# Patient Record
Sex: MALE | Race: BLACK OR AFRICAN AMERICAN | NOT HISPANIC OR LATINO | Employment: OTHER | URBAN - METROPOLITAN AREA
[De-identification: names, ages, dates, MRNs, and addresses within clinical notes are randomized per-mention and may not be internally consistent; named-entity substitution may affect disease eponyms.]

---

## 2024-04-25 ENCOUNTER — CLINICAL SUPPORT (OUTPATIENT)
Dept: EMERGENCY MEDICINE | Facility: HOSPITAL | Age: 59
End: 2024-04-25
Payer: COMMERCIAL

## 2024-04-25 ENCOUNTER — APPOINTMENT (OUTPATIENT)
Dept: RADIOLOGY | Facility: HOSPITAL | Age: 59
End: 2024-04-25
Payer: COMMERCIAL

## 2024-04-25 ENCOUNTER — HOSPITAL ENCOUNTER (OUTPATIENT)
Facility: HOSPITAL | Age: 59
Setting detail: OBSERVATION
Discharge: HOME | End: 2024-04-28
Attending: EMERGENCY MEDICINE | Admitting: INTERNAL MEDICINE
Payer: COMMERCIAL

## 2024-04-25 ENCOUNTER — APPOINTMENT (OUTPATIENT)
Dept: CARDIOLOGY | Facility: HOSPITAL | Age: 59
End: 2024-04-25
Payer: COMMERCIAL

## 2024-04-25 DIAGNOSIS — N17.9 AKI (ACUTE KIDNEY INJURY) (CMS-HCC): ICD-10-CM

## 2024-04-25 DIAGNOSIS — G89.29 CHRONIC PAIN OF LEFT KNEE: ICD-10-CM

## 2024-04-25 DIAGNOSIS — R79.89 ELEVATED TROPONIN: ICD-10-CM

## 2024-04-25 DIAGNOSIS — R07.9 CHEST PAIN, UNSPECIFIED TYPE: Primary | ICD-10-CM

## 2024-04-25 DIAGNOSIS — K04.7 TOOTH ABSCESS: ICD-10-CM

## 2024-04-25 DIAGNOSIS — M25.562 CHRONIC PAIN OF LEFT KNEE: ICD-10-CM

## 2024-04-25 DIAGNOSIS — I10 HYPERTENSION, UNSPECIFIED TYPE: ICD-10-CM

## 2024-04-25 PROBLEM — D64.9 ANEMIA: Status: ACTIVE | Noted: 2024-04-25

## 2024-04-25 PROBLEM — C64.2 MALIGNANT NEOPLASM OF LEFT KIDNEY (MULTI): Status: ACTIVE | Noted: 2024-04-25

## 2024-04-25 PROBLEM — E78.5 HYPERLIPIDEMIA: Status: ACTIVE | Noted: 2024-04-25

## 2024-04-25 PROBLEM — I63.9 CEREBROVASCULAR ACCIDENT (CVA) (MULTI): Status: ACTIVE | Noted: 2024-04-25

## 2024-04-25 PROBLEM — N28.89 LEFT RENAL MASS: Status: ACTIVE | Noted: 2024-04-25

## 2024-04-25 PROBLEM — G80.9 CEREBRAL PALSY (MULTI): Status: ACTIVE | Noted: 2024-04-25

## 2024-04-25 LAB
ALBUMIN SERPL BCP-MCNC: 4.2 G/DL (ref 3.4–5)
ALP SERPL-CCNC: 46 U/L (ref 33–120)
ALT SERPL W P-5'-P-CCNC: 17 U/L (ref 10–52)
AMPHETAMINES UR QL SCN: ABNORMAL
ANION GAP SERPL CALC-SCNC: 15 MMOL/L (ref 10–20)
AORTIC VALVE MEAN GRADIENT: 4.2 MMHG
AORTIC VALVE PEAK VELOCITY: 1.49 M/S
AST SERPL W P-5'-P-CCNC: 25 U/L (ref 9–39)
AV PEAK GRADIENT: 8.9 MMHG
AVA (PEAK VEL): 2.22 CM2
AVA (VTI): 2.64 CM2
BARBITURATES UR QL SCN: ABNORMAL
BASOPHILS # BLD AUTO: 0.02 X10*3/UL (ref 0–0.1)
BASOPHILS NFR BLD AUTO: 0.4 %
BENZODIAZ UR QL SCN: ABNORMAL
BILIRUB SERPL-MCNC: 0.9 MG/DL (ref 0–1.2)
BNP SERPL-MCNC: 64 PG/ML (ref 0–99)
BUN SERPL-MCNC: 11 MG/DL (ref 6–23)
BZE UR QL SCN: ABNORMAL
CALCIUM SERPL-MCNC: 9.3 MG/DL (ref 8.6–10.6)
CANNABINOIDS UR QL SCN: ABNORMAL
CARDIAC TROPONIN I PNL SERPL HS: 204 NG/L (ref 0–53)
CARDIAC TROPONIN I PNL SERPL HS: 205 NG/L (ref 0–53)
CHLORIDE SERPL-SCNC: 103 MMOL/L (ref 98–107)
CO2 SERPL-SCNC: 25 MMOL/L (ref 21–32)
CREAT SERPL-MCNC: 0.92 MG/DL (ref 0.5–1.3)
EGFRCR SERPLBLD CKD-EPI 2021: >90 ML/MIN/1.73M*2
EJECTION FRACTION APICAL 4 CHAMBER: 58.3
EOSINOPHIL # BLD AUTO: 0.25 X10*3/UL (ref 0–0.7)
EOSINOPHIL NFR BLD AUTO: 5.6 %
ERYTHROCYTE [DISTWIDTH] IN BLOOD BY AUTOMATED COUNT: 14.3 % (ref 11.5–14.5)
ETHANOL SERPL-MCNC: <10 MG/DL
FENTANYL+NORFENTANYL UR QL SCN: ABNORMAL
FLUAV RNA RESP QL NAA+PROBE: NOT DETECTED
FLUBV RNA RESP QL NAA+PROBE: NOT DETECTED
GLUCOSE SERPL-MCNC: 90 MG/DL (ref 74–99)
HCT VFR BLD AUTO: 34.1 % (ref 41–52)
HGB BLD-MCNC: 11.5 G/DL (ref 13.5–17.5)
IMM GRANULOCYTES # BLD AUTO: 0.01 X10*3/UL (ref 0–0.7)
IMM GRANULOCYTES NFR BLD AUTO: 0.2 % (ref 0–0.9)
LEFT ATRIUM VOLUME AREA LENGTH INDEX BSA: 38 ML/M2
LEFT VENTRICLE INTERNAL DIMENSION DIASTOLE: 5.34 CM (ref 3.5–6)
LEFT VENTRICULAR OUTFLOW TRACT DIAMETER: 2.2 CM
LV EJECTION FRACTION BIPLANE: 42 %
LYMPHOCYTES # BLD AUTO: 1 X10*3/UL (ref 1.2–4.8)
LYMPHOCYTES NFR BLD AUTO: 22.4 %
MCH RBC QN AUTO: 23.3 PG (ref 26–34)
MCHC RBC AUTO-ENTMCNC: 33.7 G/DL (ref 32–36)
MCV RBC AUTO: 69 FL (ref 80–100)
METHADONE UR QL SCN: ABNORMAL
MITRAL VALVE E/A RATIO: 0.84
MITRAL VALVE E/E' RATIO: 13.7
MONOCYTES # BLD AUTO: 0.38 X10*3/UL (ref 0.1–1)
MONOCYTES NFR BLD AUTO: 8.5 %
NEUTROPHILS # BLD AUTO: 2.8 X10*3/UL (ref 1.2–7.7)
NEUTROPHILS NFR BLD AUTO: 62.9 %
NRBC BLD-RTO: 0 /100 WBCS (ref 0–0)
OPIATES UR QL SCN: ABNORMAL
OXYCODONE+OXYMORPHONE UR QL SCN: ABNORMAL
PCP UR QL SCN: ABNORMAL
PLATELET # BLD AUTO: 194 X10*3/UL (ref 150–450)
POTASSIUM SERPL-SCNC: 3.7 MMOL/L (ref 3.5–5.3)
PROT SERPL-MCNC: 7.4 G/DL (ref 6.4–8.2)
RBC # BLD AUTO: 4.93 X10*6/UL (ref 4.5–5.9)
RIGHT VENTRICLE FREE WALL PEAK S': 10 CM/S
SARS-COV-2 RNA RESP QL NAA+PROBE: NOT DETECTED
SODIUM SERPL-SCNC: 139 MMOL/L (ref 136–145)
TRICUSPID ANNULAR PLANE SYSTOLIC EXCURSION: 2 CM
WBC # BLD AUTO: 4.5 X10*3/UL (ref 4.4–11.3)

## 2024-04-25 PROCEDURE — 80053 COMPREHEN METABOLIC PANEL: CPT

## 2024-04-25 PROCEDURE — 83880 ASSAY OF NATRIURETIC PEPTIDE: CPT

## 2024-04-25 PROCEDURE — 36415 COLL VENOUS BLD VENIPUNCTURE: CPT

## 2024-04-25 PROCEDURE — 2500000004 HC RX 250 GENERAL PHARMACY W/ HCPCS (ALT 636 FOR OP/ED): Mod: SE | Performed by: STUDENT IN AN ORGANIZED HEALTH CARE EDUCATION/TRAINING PROGRAM

## 2024-04-25 PROCEDURE — 84484 ASSAY OF TROPONIN QUANT: CPT

## 2024-04-25 PROCEDURE — G0378 HOSPITAL OBSERVATION PER HR: HCPCS

## 2024-04-25 PROCEDURE — 99223 1ST HOSP IP/OBS HIGH 75: CPT | Performed by: NURSE PRACTITIONER

## 2024-04-25 PROCEDURE — 93306 TTE W/DOPPLER COMPLETE: CPT

## 2024-04-25 PROCEDURE — 82077 ASSAY SPEC XCP UR&BREATH IA: CPT | Performed by: NURSE PRACTITIONER

## 2024-04-25 PROCEDURE — 2500000001 HC RX 250 WO HCPCS SELF ADMINISTERED DRUGS (ALT 637 FOR MEDICARE OP)

## 2024-04-25 PROCEDURE — 71046 X-RAY EXAM CHEST 2 VIEWS: CPT

## 2024-04-25 PROCEDURE — 73564 X-RAY EXAM KNEE 4 OR MORE: CPT | Mod: LEFT SIDE | Performed by: RADIOLOGY

## 2024-04-25 PROCEDURE — 96374 THER/PROPH/DIAG INJ IV PUSH: CPT | Mod: 59

## 2024-04-25 PROCEDURE — 80307 DRUG TEST PRSMV CHEM ANLYZR: CPT | Performed by: NURSE PRACTITIONER

## 2024-04-25 PROCEDURE — 2500000005 HC RX 250 GENERAL PHARMACY W/O HCPCS: Mod: SE | Performed by: NURSE PRACTITIONER

## 2024-04-25 PROCEDURE — 99285 EMERGENCY DEPT VISIT HI MDM: CPT | Mod: 25

## 2024-04-25 PROCEDURE — 93306 TTE W/DOPPLER COMPLETE: CPT | Performed by: STUDENT IN AN ORGANIZED HEALTH CARE EDUCATION/TRAINING PROGRAM

## 2024-04-25 PROCEDURE — 96372 THER/PROPH/DIAG INJ SC/IM: CPT | Performed by: NURSE PRACTITIONER

## 2024-04-25 PROCEDURE — 2500000001 HC RX 250 WO HCPCS SELF ADMINISTERED DRUGS (ALT 637 FOR MEDICARE OP): Mod: SE | Performed by: NURSE PRACTITIONER

## 2024-04-25 PROCEDURE — 93308 TTE F-UP OR LMTD: CPT

## 2024-04-25 PROCEDURE — 2500000004 HC RX 250 GENERAL PHARMACY W/ HCPCS (ALT 636 FOR OP/ED): Mod: SE | Performed by: NURSE PRACTITIONER

## 2024-04-25 PROCEDURE — 2500000006 HC RX 250 W HCPCS SELF ADMINISTERED DRUGS (ALT 637 FOR ALL PAYERS): Mod: SE | Performed by: NURSE PRACTITIONER

## 2024-04-25 PROCEDURE — 93005 ELECTROCARDIOGRAM TRACING: CPT | Mod: 77

## 2024-04-25 PROCEDURE — 73560 X-RAY EXAM OF KNEE 1 OR 2: CPT | Mod: 59,LT

## 2024-04-25 PROCEDURE — 87636 SARSCOV2 & INF A&B AMP PRB: CPT

## 2024-04-25 PROCEDURE — 93005 ELECTROCARDIOGRAM TRACING: CPT

## 2024-04-25 PROCEDURE — 85025 COMPLETE CBC W/AUTO DIFF WBC: CPT

## 2024-04-25 PROCEDURE — 71046 X-RAY EXAM CHEST 2 VIEWS: CPT | Mod: FOREIGN READ | Performed by: RADIOLOGY

## 2024-04-25 PROCEDURE — 73564 X-RAY EXAM KNEE 4 OR MORE: CPT | Mod: LT

## 2024-04-25 RX ORDER — AMLODIPINE BESYLATE 5 MG/1
10 TABLET ORAL ONCE
Status: COMPLETED | OUTPATIENT
Start: 2024-04-25 | End: 2024-04-25

## 2024-04-25 RX ORDER — ERGOCALCIFEROL 1.25 MG/1
50000 CAPSULE ORAL
COMMUNITY
Start: 2023-10-23

## 2024-04-25 RX ORDER — FINASTERIDE 5 MG/1
5 TABLET, FILM COATED ORAL DAILY
Status: DISCONTINUED | OUTPATIENT
Start: 2024-04-25 | End: 2024-04-28 | Stop reason: HOSPADM

## 2024-04-25 RX ORDER — LANOLIN ALCOHOL/MO/W.PET/CERES
100 CREAM (GRAM) TOPICAL DAILY
Status: DISCONTINUED | OUTPATIENT
Start: 2024-04-28 | End: 2024-04-28 | Stop reason: HOSPADM

## 2024-04-25 RX ORDER — FOLIC ACID 1 MG/1
1 TABLET ORAL DAILY
Status: DISCONTINUED | OUTPATIENT
Start: 2024-04-25 | End: 2024-04-28 | Stop reason: HOSPADM

## 2024-04-25 RX ORDER — ASPIRIN 81 MG/1
81 TABLET ORAL DAILY
Status: DISCONTINUED | OUTPATIENT
Start: 2024-04-25 | End: 2024-04-28 | Stop reason: HOSPADM

## 2024-04-25 RX ORDER — AMLODIPINE BESYLATE 5 MG/1
5 TABLET ORAL DAILY
COMMUNITY
Start: 2023-10-23

## 2024-04-25 RX ORDER — LORAZEPAM 1 MG/1
1 TABLET ORAL EVERY 2 HOUR PRN
Status: DISCONTINUED | OUTPATIENT
Start: 2024-04-25 | End: 2024-04-28 | Stop reason: HOSPADM

## 2024-04-25 RX ORDER — TIMOLOL MALEATE 5 MG/ML
1 SOLUTION/ DROPS OPHTHALMIC DAILY
COMMUNITY
Start: 2024-04-17

## 2024-04-25 RX ORDER — LORAZEPAM 1 MG/1
0.5 TABLET ORAL EVERY 2 HOUR PRN
Status: DISCONTINUED | OUTPATIENT
Start: 2024-04-25 | End: 2024-04-28 | Stop reason: HOSPADM

## 2024-04-25 RX ORDER — METOPROLOL SUCCINATE 50 MG/1
50 TABLET, EXTENDED RELEASE ORAL DAILY
COMMUNITY

## 2024-04-25 RX ORDER — HYDROCHLOROTHIAZIDE 25 MG/1
12.5 TABLET ORAL ONCE
Status: COMPLETED | OUTPATIENT
Start: 2024-04-25 | End: 2024-04-25

## 2024-04-25 RX ORDER — CHLORTHALIDONE 25 MG/1
25 TABLET ORAL DAILY
Status: DISCONTINUED | OUTPATIENT
Start: 2024-04-25 | End: 2024-04-28 | Stop reason: HOSPADM

## 2024-04-25 RX ORDER — LANOLIN ALCOHOL/MO/W.PET/CERES
100 CREAM (GRAM) TOPICAL DAILY
COMMUNITY
Start: 2023-02-19

## 2024-04-25 RX ORDER — TAMSULOSIN HYDROCHLORIDE 0.4 MG/1
0.4 CAPSULE ORAL DAILY
Status: DISCONTINUED | OUTPATIENT
Start: 2024-04-25 | End: 2024-04-28 | Stop reason: HOSPADM

## 2024-04-25 RX ORDER — DOXEPIN HYDROCHLORIDE 50 MG/1
100 CAPSULE ORAL NIGHTLY
Status: DISCONTINUED | OUTPATIENT
Start: 2024-04-25 | End: 2024-04-28 | Stop reason: HOSPADM

## 2024-04-25 RX ORDER — FINASTERIDE 5 MG/1
5 TABLET, FILM COATED ORAL DAILY
COMMUNITY

## 2024-04-25 RX ORDER — TRAZODONE HYDROCHLORIDE 100 MG/1
100 TABLET ORAL NIGHTLY
COMMUNITY
Start: 2023-10-23

## 2024-04-25 RX ORDER — OXYBUTYNIN CHLORIDE 5 MG/1
5 TABLET ORAL 3 TIMES DAILY
Status: DISCONTINUED | OUTPATIENT
Start: 2024-04-25 | End: 2024-04-28 | Stop reason: HOSPADM

## 2024-04-25 RX ORDER — FLUOXETINE HYDROCHLORIDE 20 MG/1
20 CAPSULE ORAL DAILY
COMMUNITY
Start: 2023-02-19

## 2024-04-25 RX ORDER — FLUOXETINE HYDROCHLORIDE 20 MG/1
20 CAPSULE ORAL DAILY
Status: DISCONTINUED | OUTPATIENT
Start: 2024-04-25 | End: 2024-04-28 | Stop reason: HOSPADM

## 2024-04-25 RX ORDER — OMEPRAZOLE 20 MG/1
20 CAPSULE, DELAYED RELEASE ORAL
COMMUNITY
Start: 2023-10-23

## 2024-04-25 RX ORDER — LIDOCAINE 560 MG/1
1 PATCH PERCUTANEOUS; TOPICAL; TRANSDERMAL DAILY
Status: DISCONTINUED | OUTPATIENT
Start: 2024-04-25 | End: 2024-04-27

## 2024-04-25 RX ORDER — LISINOPRIL 5 MG/1
10 TABLET ORAL ONCE
Status: COMPLETED | OUTPATIENT
Start: 2024-04-25 | End: 2024-04-25

## 2024-04-25 RX ORDER — TRAZODONE HYDROCHLORIDE 50 MG/1
100 TABLET ORAL NIGHTLY
Status: DISCONTINUED | OUTPATIENT
Start: 2024-04-25 | End: 2024-04-28 | Stop reason: HOSPADM

## 2024-04-25 RX ORDER — ASPIRIN 81 MG/1
81 TABLET ORAL DAILY
COMMUNITY
End: 2024-04-28 | Stop reason: HOSPADM

## 2024-04-25 RX ORDER — FOLIC ACID 1 MG/1
1 TABLET ORAL DAILY
COMMUNITY
Start: 2022-08-09

## 2024-04-25 RX ORDER — LISINOPRIL 20 MG/1
20 TABLET ORAL DAILY
COMMUNITY
Start: 2023-10-23

## 2024-04-25 RX ORDER — OXYBUTYNIN CHLORIDE 15 MG/1
15 TABLET, EXTENDED RELEASE ORAL DAILY
COMMUNITY

## 2024-04-25 RX ORDER — AMOXICILLIN 250 MG
2 CAPSULE ORAL 2 TIMES DAILY
Status: DISCONTINUED | OUTPATIENT
Start: 2024-04-25 | End: 2024-04-28 | Stop reason: HOSPADM

## 2024-04-25 RX ORDER — BENZONATATE 100 MG/1
100 CAPSULE ORAL 3 TIMES DAILY PRN
Status: DISCONTINUED | OUTPATIENT
Start: 2024-04-25 | End: 2024-04-28 | Stop reason: HOSPADM

## 2024-04-25 RX ORDER — BACLOFEN 10 MG/1
10 TABLET ORAL 3 TIMES DAILY
Status: DISCONTINUED | OUTPATIENT
Start: 2024-04-25 | End: 2024-04-28 | Stop reason: HOSPADM

## 2024-04-25 RX ORDER — METOPROLOL TARTRATE 50 MG/1
50 TABLET ORAL ONCE
Status: COMPLETED | OUTPATIENT
Start: 2024-04-25 | End: 2024-04-25

## 2024-04-25 RX ORDER — AMLODIPINE BESYLATE 5 MG/1
5 TABLET ORAL DAILY
Status: DISCONTINUED | OUTPATIENT
Start: 2024-04-25 | End: 2024-04-28 | Stop reason: HOSPADM

## 2024-04-25 RX ORDER — TALC
3 POWDER (GRAM) TOPICAL NIGHTLY
Status: DISCONTINUED | OUTPATIENT
Start: 2024-04-25 | End: 2024-04-28 | Stop reason: HOSPADM

## 2024-04-25 RX ORDER — DICLOFENAC SODIUM 10 MG/G
4 GEL TOPICAL 4 TIMES DAILY
Status: DISCONTINUED | OUTPATIENT
Start: 2024-04-25 | End: 2024-04-28 | Stop reason: HOSPADM

## 2024-04-25 RX ORDER — ACETAMINOPHEN 325 MG/1
975 TABLET ORAL EVERY 6 HOURS
Status: DISCONTINUED | OUTPATIENT
Start: 2024-04-25 | End: 2024-04-27

## 2024-04-25 RX ORDER — PANTOPRAZOLE SODIUM 40 MG/1
40 TABLET, DELAYED RELEASE ORAL
Status: DISCONTINUED | OUTPATIENT
Start: 2024-04-26 | End: 2024-04-28 | Stop reason: HOSPADM

## 2024-04-25 RX ORDER — GABAPENTIN 600 MG/1
600 TABLET ORAL 3 TIMES DAILY
COMMUNITY
Start: 2023-10-22

## 2024-04-25 RX ORDER — DOXEPIN HYDROCHLORIDE 100 MG/1
100 CAPSULE ORAL NIGHTLY
COMMUNITY
Start: 2023-10-23

## 2024-04-25 RX ORDER — BACLOFEN 10 MG/1
10 TABLET ORAL 3 TIMES DAILY
COMMUNITY
Start: 2023-10-22

## 2024-04-25 RX ORDER — TAMSULOSIN HYDROCHLORIDE 0.4 MG/1
0.4 CAPSULE ORAL DAILY
COMMUNITY
Start: 2023-02-19

## 2024-04-25 RX ORDER — GABAPENTIN 300 MG/1
600 CAPSULE ORAL 3 TIMES DAILY
Status: DISCONTINUED | OUTPATIENT
Start: 2024-04-25 | End: 2024-04-28 | Stop reason: HOSPADM

## 2024-04-25 RX ORDER — CHLORTHALIDONE 25 MG/1
25 TABLET ORAL DAILY
COMMUNITY
Start: 2023-10-23

## 2024-04-25 RX ORDER — LISINOPRIL 10 MG/1
20 TABLET ORAL DAILY
Status: DISCONTINUED | OUTPATIENT
Start: 2024-04-25 | End: 2024-04-28 | Stop reason: HOSPADM

## 2024-04-25 RX ORDER — MULTIVIT-MIN/IRON FUM/FOLIC AC 7.5 MG-4
1 TABLET ORAL DAILY
Status: DISCONTINUED | OUTPATIENT
Start: 2024-04-25 | End: 2024-04-28 | Stop reason: HOSPADM

## 2024-04-25 RX ORDER — GUAIFENESIN 600 MG/1
600 TABLET, EXTENDED RELEASE ORAL 2 TIMES DAILY
Status: DISCONTINUED | OUTPATIENT
Start: 2024-04-25 | End: 2024-04-28 | Stop reason: HOSPADM

## 2024-04-25 RX ORDER — ASPIRIN 81 MG/1
81 TABLET ORAL DAILY
COMMUNITY
Start: 2022-08-09 | End: 2024-04-25 | Stop reason: ENTERED-IN-ERROR

## 2024-04-25 RX ORDER — METOPROLOL SUCCINATE 50 MG/1
50 TABLET, EXTENDED RELEASE ORAL DAILY
Status: DISCONTINUED | OUTPATIENT
Start: 2024-04-25 | End: 2024-04-28 | Stop reason: HOSPADM

## 2024-04-25 RX ORDER — THIAMINE HYDROCHLORIDE 100 MG/ML
100 INJECTION, SOLUTION INTRAMUSCULAR; INTRAVENOUS DAILY
Status: COMPLETED | OUTPATIENT
Start: 2024-04-25 | End: 2024-04-27

## 2024-04-25 RX ORDER — ENOXAPARIN SODIUM 100 MG/ML
40 INJECTION SUBCUTANEOUS EVERY 24 HOURS
Status: DISCONTINUED | OUTPATIENT
Start: 2024-04-25 | End: 2024-04-28 | Stop reason: HOSPADM

## 2024-04-25 RX ORDER — TALC
3 POWDER (GRAM) TOPICAL NIGHTLY
COMMUNITY
Start: 2023-10-22

## 2024-04-25 RX ORDER — TIMOLOL MALEATE 5 MG/ML
1 SOLUTION/ DROPS OPHTHALMIC DAILY
Status: DISCONTINUED | OUTPATIENT
Start: 2024-04-25 | End: 2024-04-28 | Stop reason: HOSPADM

## 2024-04-25 RX ADMIN — HYDROCHLOROTHIAZIDE 12.5 MG: 25 TABLET ORAL at 08:13

## 2024-04-25 RX ADMIN — AMLODIPINE BESYLATE 10 MG: 5 TABLET ORAL at 08:13

## 2024-04-25 RX ADMIN — DICLOFENAC 4 G: 10 GEL TOPICAL at 12:29

## 2024-04-25 RX ADMIN — BENZONATATE 100 MG: 100 CAPSULE ORAL at 12:28

## 2024-04-25 RX ADMIN — LISINOPRIL 10 MG: 5 TABLET ORAL at 08:13

## 2024-04-25 RX ADMIN — OXYBUTYNIN CHLORIDE 5 MG: 5 TABLET ORAL at 14:28

## 2024-04-25 RX ADMIN — GUAIFENESIN 600 MG: 600 TABLET ORAL at 12:28

## 2024-04-25 RX ADMIN — DOXEPIN HYDROCHLORIDE 100 MG: 50 CAPSULE ORAL at 20:35

## 2024-04-25 RX ADMIN — FLUOXETINE 20 MG: 20 CAPSULE ORAL at 12:28

## 2024-04-25 RX ADMIN — TIMOLOL MALEATE 1 DROP: 5 SOLUTION/ DROPS OPHTHALMIC at 12:29

## 2024-04-25 RX ADMIN — GABAPENTIN 600 MG: 300 CAPSULE ORAL at 20:34

## 2024-04-25 RX ADMIN — GABAPENTIN 600 MG: 300 CAPSULE ORAL at 14:28

## 2024-04-25 RX ADMIN — FINASTERIDE 5 MG: 5 TABLET, FILM COATED ORAL at 12:28

## 2024-04-25 RX ADMIN — TRAZODONE HYDROCHLORIDE 100 MG: 50 TABLET ORAL at 20:35

## 2024-04-25 RX ADMIN — METOPROLOL TARTRATE 50 MG: 50 TABLET, FILM COATED ORAL at 08:13

## 2024-04-25 RX ADMIN — PERFLUTREN 3 ML OF DILUTION: 6.52 INJECTION, SUSPENSION INTRAVENOUS at 14:05

## 2024-04-25 RX ADMIN — ACETAMINOPHEN 975 MG: 325 TABLET ORAL at 12:28

## 2024-04-25 RX ADMIN — TAMSULOSIN HYDROCHLORIDE 0.4 MG: 0.4 CAPSULE ORAL at 12:28

## 2024-04-25 RX ADMIN — ENOXAPARIN SODIUM 40 MG: 100 INJECTION SUBCUTANEOUS at 20:37

## 2024-04-25 RX ADMIN — BACLOFEN 10 MG: 10 TABLET ORAL at 14:28

## 2024-04-25 RX ADMIN — FOLIC ACID 1 MG: 1 TABLET ORAL at 12:29

## 2024-04-25 RX ADMIN — ASPIRIN 81 MG: 81 TABLET, COATED ORAL at 12:29

## 2024-04-25 RX ADMIN — SENNOSIDES AND DOCUSATE SODIUM 2 TABLET: 8.6; 5 TABLET ORAL at 12:29

## 2024-04-25 RX ADMIN — GUAIFENESIN 600 MG: 600 TABLET ORAL at 20:33

## 2024-04-25 RX ADMIN — Medication 1 TABLET: at 12:29

## 2024-04-25 RX ADMIN — BACLOFEN 10 MG: 10 TABLET ORAL at 20:33

## 2024-04-25 RX ADMIN — LIDOCAINE 1 PATCH: 4 PATCH TOPICAL at 12:29

## 2024-04-25 RX ADMIN — OXYBUTYNIN CHLORIDE 5 MG: 5 TABLET ORAL at 20:34

## 2024-04-25 RX ADMIN — THIAMINE HYDROCHLORIDE 100 MG: 100 INJECTION, SOLUTION INTRAMUSCULAR; INTRAVENOUS at 12:30

## 2024-04-25 RX ADMIN — MELATONIN 3 MG: 3 TAB ORAL at 20:34

## 2024-04-25 ASSESSMENT — LIFESTYLE VARIABLES
VISUAL DISTURBANCES: NOT PRESENT
NAUSEA AND VOMITING: NO NAUSEA AND NO VOMITING
TREMOR: NO TREMOR
BLOOD PRESSURE: 163/103
VISUAL DISTURBANCES: NOT PRESENT
PULSE: 69
ANXIETY: NO ANXIETY, AT EASE
TREMOR: NO TREMOR
ORIENTATION AND CLOUDING OF SENSORIUM: ORIENTED AND CAN DO SERIAL ADDITIONS
PAROXYSMAL SWEATS: NO SWEAT VISIBLE
ORIENTATION AND CLOUDING OF SENSORIUM: ORIENTED AND CAN DO SERIAL ADDITIONS
TOTAL SCORE: 0
PULSE: 71
BLOOD PRESSURE: 126/102
TREMOR: NO TREMOR
PULSE: 72
TOTAL SCORE: 0
PAROXYSMAL SWEATS: NO SWEAT VISIBLE
EVER FELT BAD OR GUILTY ABOUT YOUR DRINKING: NO
BLOOD PRESSURE: 119/80
PAROXYSMAL SWEATS: NO SWEAT VISIBLE
NAUSEA AND VOMITING: NO NAUSEA AND NO VOMITING
NAUSEA AND VOMITING: NO NAUSEA AND NO VOMITING
TOTAL SCORE: 0
AGITATION: NORMAL ACTIVITY
BLOOD PRESSURE: 145/103
TOTAL SCORE: 0
AGITATION: NORMAL ACTIVITY
AGITATION: NORMAL ACTIVITY
ANXIETY: NO ANXIETY, AT EASE
NAUSEA AND VOMITING: NO NAUSEA AND NO VOMITING
AUDITORY DISTURBANCES: NOT PRESENT
TREMOR: NO TREMOR
HEADACHE, FULLNESS IN HEAD: NOT PRESENT
ANXIETY: NO ANXIETY, AT EASE
HEADACHE, FULLNESS IN HEAD: NOT PRESENT
HEADACHE, FULLNESS IN HEAD: NOT PRESENT
PULSE: 66
TOTAL SCORE: 0
HEADACHE, FULLNESS IN HEAD: NOT PRESENT
AGITATION: NORMAL ACTIVITY
HAVE YOU EVER FELT YOU SHOULD CUT DOWN ON YOUR DRINKING: NO
VISUAL DISTURBANCES: NOT PRESENT
AGITATION: NORMAL ACTIVITY
AUDITORY DISTURBANCES: NOT PRESENT
AUDITORY DISTURBANCES: NOT PRESENT
NAUSEA AND VOMITING: NO NAUSEA AND NO VOMITING
AUDITORY DISTURBANCES: NOT PRESENT
TOTAL SCORE: 0
AUDITORY DISTURBANCES: NOT PRESENT
TREMOR: NO TREMOR
HEADACHE, FULLNESS IN HEAD: NOT PRESENT
ORIENTATION AND CLOUDING OF SENSORIUM: ORIENTED AND CAN DO SERIAL ADDITIONS
VISUAL DISTURBANCES: NOT PRESENT
ANXIETY: NO ANXIETY, AT EASE
ORIENTATION AND CLOUDING OF SENSORIUM: ORIENTED AND CAN DO SERIAL ADDITIONS
ORIENTATION AND CLOUDING OF SENSORIUM: ORIENTED AND CAN DO SERIAL ADDITIONS
ANXIETY: NO ANXIETY, AT EASE
VISUAL DISTURBANCES: NOT PRESENT
EVER HAD A DRINK FIRST THING IN THE MORNING TO STEADY YOUR NERVES TO GET RID OF A HANGOVER: NO
HAVE PEOPLE ANNOYED YOU BY CRITICIZING YOUR DRINKING: NO

## 2024-04-25 ASSESSMENT — ENCOUNTER SYMPTOMS
CONSTITUTIONAL NEGATIVE: 1
CHEST TIGHTNESS: 1
MUSCULOSKELETAL NEGATIVE: 1
COUGH: 1
WHEEZING: 0
GASTROINTESTINAL NEGATIVE: 1
NEUROLOGICAL NEGATIVE: 1
EYES NEGATIVE: 1

## 2024-04-25 ASSESSMENT — PAIN DESCRIPTION - DESCRIPTORS: DESCRIPTORS: STABBING

## 2024-04-25 ASSESSMENT — PAIN - FUNCTIONAL ASSESSMENT
PAIN_FUNCTIONAL_ASSESSMENT: 0-10
PAIN_FUNCTIONAL_ASSESSMENT: 0-10

## 2024-04-25 ASSESSMENT — PAIN SCALES - GENERAL
PAINLEVEL_OUTOF10: 8
PAINLEVEL_OUTOF10: 8
PAINLEVEL_OUTOF10: 2
PAINLEVEL_OUTOF10: 7

## 2024-04-25 ASSESSMENT — COLUMBIA-SUICIDE SEVERITY RATING SCALE - C-SSRS
1. IN THE PAST MONTH, HAVE YOU WISHED YOU WERE DEAD OR WISHED YOU COULD GO TO SLEEP AND NOT WAKE UP?: NO
6. HAVE YOU EVER DONE ANYTHING, STARTED TO DO ANYTHING, OR PREPARED TO DO ANYTHING TO END YOUR LIFE?: NO
2. HAVE YOU ACTUALLY HAD ANY THOUGHTS OF KILLING YOURSELF?: NO

## 2024-04-25 NOTE — ED PROVIDER NOTES
HPI   No chief complaint on file.      Patient is a 58-year-old male with past medical history of HTN & RCC (S/P Partial Nephrectomy), HFrEF (40-45% EF 06/22), BPH, MS, Treated HCV, Hepatic Steatosis, Cerebellar Stroke (w/ residual unsteadiness), Substance Use Disorder presenting with concern for chest pain, malaise, cough, left knee pain.  Endorses several days of subjective fevers, cough with chest pain that started at rest today.  Endorses woken from sleep with chest pain that is left-sided, sharp without radiation or associated shortness of breath, nausea, vomiting, diaphoresis.  Does endorse it is worse when he coughs or takes a deep breath.  Does not endorse history of blood clots or anticoagulation use.  Does endorse he is taking all his blood pressure medications but did not take his a.m. doses today.  Otherwise endorses concern for left-sided knee pain that endorses feels similar to his prior MS flares.  Endorses no current medications for the MS.  Does report recurrent recent falls and ambulating at baseline with walker.                        No data recorded                   Patient History   Past Medical History:   Diagnosis Date    Abnormal findings on diagnostic imaging of other specified body structures     Abnormal finding on imaging    Alcoholic liver disease, unspecified (Multi)     Liver disease due to alcohol    Liver disease, unspecified     Chronic liver disease    Personal history of other diseases of the circulatory system     History of hypertension    Unspecified asthma, uncomplicated (HHS-HCC)     Childhood asthma, unspecified asthma severity, uncomplicated     Past Surgical History:   Procedure Laterality Date    CT ABDOMEN PELVIS ANGIOGRAM W AND/OR WO IV CONTRAST  1/26/2018    CT ABDOMEN PELVIS ANGIOGRAM W AND/OR WO IV CONTRAST 1/26/2018 [facility] INPATIENT LEGACY     No family history on file.  Social History     Tobacco Use    Smoking status: Not on file    Smokeless tobacco: Not on file    Substance Use Topics    Alcohol use: Not on file    Drug use: Not on file       Physical Exam   ED Triage Vitals   Temp Pulse Resp BP   -- -- -- --      SpO2 Temp src Heart Rate Source Patient Position   -- -- -- --      BP Location FiO2 (%)     -- --       Physical Exam  Vitals and nursing note reviewed.   Constitutional:       General: He is not in acute distress.     Appearance: He is well-developed.   HENT:      Head: Normocephalic and atraumatic.   Eyes:      Conjunctiva/sclera: Conjunctivae normal.   Cardiovascular:      Rate and Rhythm: Normal rate and regular rhythm.      Heart sounds: No murmur heard.     Comments: 2+ radials and PTs.  Pulmonary:      Effort: Pulmonary effort is normal. No respiratory distress.      Breath sounds: Normal breath sounds.   Abdominal:      Palpations: Abdomen is soft.      Tenderness: There is no abdominal tenderness.   Musculoskeletal:         General: No swelling.      Cervical back: Neck supple.      Comments: Passive management range of motion of knee intact.  No significant swelling or tenderness palpation.    No significant lower extremity swelling.  Calf symmetric bilaterally.  No tautness or tenderness palpation.   Skin:     General: Skin is warm and dry.      Capillary Refill: Capillary refill takes less than 2 seconds.   Neurological:      General: No focal deficit present.      Mental Status: He is alert and oriented to person, place, and time.   Psychiatric:         Mood and Affect: Mood normal.         ED Course & MDM   ED Course as of 04/25/24 1036   Thu Apr 25, 2024   0735 EKG NSR HR 74 qtc 444 mild PIPO in v2 and v3, no STD, TWI v5 and v6 c/w prior from May 2023 [AM]      ED Course User Index  [AM] Zachary Mary MD         Diagnoses as of 04/25/24 1036   Chest pain, unspecified type   Hypertension, unspecified type   Chronic pain of left knee   Elevated troponin       Medical Decision Making  EKG shows a normal rate of 74bpm, normal sinus rhythm, normal  axis,  ms, QRS 94 ms, QTc 444 ms.  Early repolarization in V2, V3, otherwise normal ST and T wave pattern with no evidence of acute ischemia or other acute findings.    Patient is a 58-year-old male with past medical history of HTN & RCC (S/P Partial Nephrectomy), HFrEF (40-45% EF 06/22), BPH, MS, Treated HCV, Hepatic Steatosis, Cerebellar Stroke (w/ residual unsteadiness), Substance Use Disorder presenting with concern for chest pain, malaise, cough, left knee pain.  EKG nonischemic, troponins trended from 205->204 which is above baseline but not markedly elevated.  Differential of chest pain includes demand due to hypertension, costochondritis due to viral syndrome with need to rule out ACS.  Patient treated with for hypertension with home blood pressure medications of amlodipine, HCTZ, lisinopril, metoprolol.  Point-of-care ultrasound otherwise with reduced EF but no other marked abnormal pathology.  Chest x-ray with no acute pathology.  Patient otherwise complaining of recurrent falls and left knee pain but trauma exam otherwise generally benign and x-ray of left knee with no acute fracture.  Given patient has elevated troponins, chest pain and has not had stress testing in 5 years, case was discussed with medicine and patient admitted to medicine for further management.    Patient seen and discussed with Dr. Tyra Norman MD, PhD  Emergency Medicine PGY2          Procedure    Performed by: Zhang Norman MD  Authorized by: Zachary Mary MD  Cardiac Indications: chest pain                Procedure: Cardiac Ultrasound    Findings:   Views: parasternal long, parasternal short, apical four and subxiphoid  The pericardial space was visualized and was NEGATIVE for a significant pericardial effusion.  Activity: Ventricular contractions were visualized.  LV: LV systolic function was DECREASED. and Comparable to slightly reduced compared with prior documented EF of 40%  RV: RV size was  NORMAL.    Impression:  Cardiac: The focused cardiac ultrasound exam had ABNORMAL findings as specified.           Zhang Norman MD  Resident  04/25/24 9516

## 2024-04-25 NOTE — H&P
HPI     Mr Self is a 58y male with PMHx: HTN & RCC (S/P Partial Nephrectomy), HFrEF (40-45% EF 06/22), BPH, MS, Treated HCV, Hepatin Steatosis, Cerebellar Stroke (w/ residual unsteadiness), Substance Use Disorder who presented to the ED today with c/o Left sided chest pain that radiates to left shoulder, he describes as a stabbing pain.  States it is worse when he coughs.  States his cough is dry hacking with occasional moist productive with white sputum. States  symptoms for about 3 days.  States he has not taken any of his BP meds today he felt too sick.      While in the ED he was found to have an elevated /118 likely 2/2 not taking morning meds- responded well to being given home medications.  His Trops were 205->204 respectively. Utox was positive for Cocaine, marijuana and Fentanyl.  Patient to be admitted observation for CP and HTN emergency.    ROS/EXAM     Review of Systems   Constitutional: Negative.    HENT: Negative.     Eyes: Negative.    Respiratory:  Positive for cough and chest tightness. Negative for wheezing.    Cardiovascular:  Positive for chest pain.   Gastrointestinal: Negative.    Genitourinary: Negative.    Musculoskeletal: Negative.    Skin: Negative.    Neurological: Negative.    All other systems reviewed and are negative.    Physical Exam  Vitals reviewed.   HENT:      Head: Normocephalic and atraumatic.      Nose: Nose normal.      Mouth/Throat:      Mouth: Mucous membranes are moist.      Pharynx: Oropharynx is clear.   Eyes:      Conjunctiva/sclera: Conjunctivae normal.   Cardiovascular:      Rate and Rhythm: Normal rate and regular rhythm.      Pulses: Normal pulses.      Heart sounds: Normal heart sounds.   Pulmonary:      Effort: Pulmonary effort is normal.      Breath sounds: Normal breath sounds.   Abdominal:      General: Abdomen is flat. Bowel sounds are normal.      Palpations: Abdomen is soft.   Musculoskeletal:         General: Normal range of motion.      Cervical  back: Normal range of motion and neck supple.   Skin:     General: Skin is warm and dry.      Capillary Refill: Capillary refill takes less than 2 seconds.   Neurological:      Mental Status: He is alert and oriented to person, place, and time.   Psychiatric:         Mood and Affect: Mood normal.         Thought Content: Thought content normal.         Judgment: Judgment normal.         Vitals/LABS/RESULTS     Last Recorded Vitals  Blood pressure (!) 163/103, pulse 71, temperature 36.8 °C (98.2 °F), temperature source Temporal, resp. rate 17, SpO2 98%.  Intake/Output last 3 Shifts:  No intake/output data recorded.    Relevant Results  Lab Results   Component Value Date    WBC 4.5 04/25/2024    HGB 11.5 (L) 04/25/2024    HCT 34.1 (L) 04/25/2024    MCV 69 (L) 04/25/2024     04/25/2024      Lab Results   Component Value Date    GLUCOSE 90 04/25/2024    CALCIUM 9.3 04/25/2024     04/25/2024    K 3.7 04/25/2024    CO2 25 04/25/2024     04/25/2024    BUN 11 04/25/2024    CREATININE 0.92 04/25/2024     XR knee left 1-2 views    Result Date: 4/25/2024  Interpreted By:  Bright Green, STUDY: Left knee dated  4/25/2024.   INDICATION: Signs/Symptoms:sunrise view requested by radiology   COMPARISON: Correlation is made with same date radiographs.   ACCESSION NUMBER(S): [id number]   ORDERING CLINICIAN: LISA VAUGHN   TECHNIQUE: Three views of the left knee.   FINDINGS: No fracture or dislocation is evident.  There is a prominent lateral marginal patellar osteophyte and there is patellar enthesophyte formation.  No soft tissue gas or radiopaque foreign body is evident.       No osseous injury is evident. The finding seen on the radiographs from earlier on the same date likely represent superimposition of the patellar enthesophyte and/or marginal patellar osteophyte.   MACRO: None   Signed by: Bright Green 4/25/2024 10:32 AM Dictation workstation:   [id number]    XR knee left 4+ views    Result Date:  4/25/2024  Interpreted By:  Bright Green, STUDY: Left knee dated  4/25/2024.   INDICATION: Signs/Symptoms:knee pain, recurrent falls   COMPARISON: None.   ACCESSION NUMBER(S): [id number]   ORDERING CLINICIAN: MILENA NORMAN   TECHNIQUE: Four views of the left knee.   FINDINGS: There is a questionable lucency along the lateral margin of the patella. No dislocation is evident. No knee effusion is evident. There is mild patellofemoral and femorotibial degenerative change. There is patellar enthesophyte formation. There is an irregular focus of increased density in the posterior distal femoral metaphysis. No overlying endosteal scalloping or periosteal reaction is evident. This likely represents an indolent chondroid or fibro-osseous lesion of bone.       1. Possible lucency along the lateral margin of the patella. Lateral marginal fracture is a differential consideration. A sunrise radiographs of the knee is recommended for further initial assessment. 2. Mild degenerative change of the knee. 3. Structure in the distal femoral metaphysis with differential considerations as above. Follow-up radiographs in 6 months time are recommended to reassess.   MACRO: None   Signed by: Bright Green 4/25/2024 9:33 AM Dictation workstation:   [id number]    XR chest 2 views    Result Date: 4/25/2024  STUDY: Chest Radiographs;  4/25/2024 7:17AM INDICATION: Chest pain. COMPARISON: 2/18/2023 XR Chest. ACCESSION NUMBER(S): [id number] ORDERING CLINICIAN: MILENA NORMAN TECHNIQUE:  Frontal and lateral chest. FINDINGS: CARDIOMEDIASTINAL SILHOUETTE: Cardiomediastinal silhouette is normal in size and configuration.  LUNGS: Lungs are clear.  ABDOMEN: No remarkable upper abdominal findings.  BONES: No acute osseous changes.    No acute process. Signed by Jose Raul Mcmullen MD    Point of Care Ultrasound    Result Date: 4/25/2024  Milena Norman MD     4/25/2024 11:04 AM Performed by: Milena Norman MD Authorized by: Zachary Mary MD  Cardiac  Indications: chest pain Procedure: Cardiac Ultrasound Findings:  Views: parasternal long, parasternal short, apical four and subxiphoid The pericardial space was visualized and was NEGATIVE for a significant pericardial effusion. Activity: Ventricular contractions were visualized. LV: LV systolic function was DECREASED. and Comparable to slightly reduced compared with prior documented EF of 40% RV: RV size was NORMAL. Impression: Cardiac: The focused cardiac ultrasound exam had ABNORMAL findings as specified.       Medications     Scheduled medications  acetaminophen, 975 mg, oral, q6h  amLODIPine, 5 mg, oral, Daily  aspirin, 81 mg, oral, Daily  baclofen, 10 mg, oral, TID  chlorthalidone, 25 mg, oral, Daily  diclofenac sodium, 4 g, Topical, 4x daily  doxepin, 100 mg, oral, Nightly  enoxaparin, 40 mg, subcutaneous, q24h  finasteride, 5 mg, oral, Daily  FLUoxetine, 20 mg, oral, Daily  folic acid, 1 mg, oral, Daily  gabapentin, 600 mg, oral, TID  guaiFENesin, 600 mg, oral, BID  lidocaine, 1 patch, transdermal, Daily  lisinopril, 20 mg, oral, Daily  melatonin, 3 mg, oral, Nightly  metoprolol succinate XL, 50 mg, oral, Daily  multivitamin with minerals, 1 tablet, oral, Daily  oxybutynin, 5 mg, oral, TID  [START ON 4/26/2024] pantoprazole, 40 mg, oral, Daily before breakfast  perflutren lipid microspheres, 0.5-10 mL of dilution, intravenous, Once  sennosides-docusate sodium, 2 tablet, oral, BID  tamsulosin, 0.4 mg, oral, Daily  [START ON 4/28/2024] thiamine, 100 mg, oral, Daily  thiamine, 100 mg, intravenous, Daily  timolol, 1 drop, Both Eyes, Daily  traZODone, 100 mg, oral, Nightly      Continuous medications     PRN medications  PRN medications: benzonatate, LORazepam **OR** LORazepam **OR** LORazepam    PLAN     Mr Self is a 58y male with PMHx: HTN & RCC (S/P Partial Nephrectomy), HFrEF (40-45% EF 06/22), BPH, MS, Treated HCV, Hepatin Steatosis, Cerebellar Stroke (w/ residual unsteadiness), Substance Use Disorder who  presented to the ED today with c/o Left sided chest pain that radiates to left shoulder, he describes as a stabbing pain.  States it is worse when he coughs.  States his cough is dry hacking with occasional moist productive with white sputum. States  symptoms for about 3 days.  States he has not taken any of his BP meds today he felt too sick.      While in the ED he was found to have an elevated /118 likely 2/2 not taking morning meds- responded well to being given home medications.  His Trops were 205->204 respectively. Utox was positive for Cocaine, marijuana and Fentanyl.  Patient to be admitted observation for CP and HTN emergency.  Assessment/Plan:    Chest Pain  HTN emergency  HFrEF  -BP on arrival 192/118->163/103  -Trop in ->204 baseline 127-184  -EKG in ED NSR and baseline for patient  -Telemetry  -daily weights, strict I/O  -Echo   -c/w home amlodipine 5mg daily  -c/w home aspirin 81mg daily  -c/w home chlorthalidone 25mg daily  -c/w lisinopril 20mg daily  -c/w metop 50mg daily    BPH  -c/w home finasteride 20mg daily  -c/w home tamsulosin 0.4mg daily    MS  -c/w home gabapentin 600mg tid  -c/w home oxybutynin 5mg daily  -c/w home doxepin 100mg daily  -c/w home baclofen 10mg tid    ETOH abuse  Multi drug abuse  Depression/anxiety  insomnia  -CIWA  -folic/MVI/thiamine  -c/w home melatonin 3mg daily  -c/w home trazodine 100mg at bedtime  -c/w home fluoxetine 20mg daily    Fluids: monitor and replete as needed  Electrolytes: monitor and replete as needed  Nutrition: Regular diet   GI prophylaxis: Protonix 40mg QD  DVT prophylaxis: SCD/lovenox  Code Status: Full Code  PCP  Pharmacy    Disposition:  Admitted for above diagnoses. Plan per above. Anticipate hospitalization >2 midnights.       Kym Bolaños, APRN-CNP    I spent 75 minutes in the professional and overall care of this patient.

## 2024-04-25 NOTE — ED TRIAGE NOTES
BIB Ems for CP that started yesterday. Patient describes pain as stabbing on Right side of chest that radiates into neck. C/O pain 8/10. Able to ambulate to bathroom unassisted with slow and steady gait to provide urine sample.

## 2024-04-25 NOTE — HOSPITAL COURSE
Mr Self is a 58y male with PMHx: HTN & RCC (S/P Partial Nephrectomy), HFrEF (40-45% EF 06/22), BPH, MS, Treated HCV, Hepatin Steatosis, Cerebellar Stroke (w/ residual unsteadiness), Substance Use Disorder who presented to the ED today with c/o Left sided chest pain that radiates to left shoulder, he describes as a stabbing pain.  States it is worse when he coughs.  States his cough is dry hacking with occasional moist productive with white sputum. States  symptoms for about 3 days.  States he has not taken any of his BP meds today he felt too sick.      While in the ED he was found to have an elevated /118 likely 2/2 not taking morning meds- responded well to being given home medications.  His Trops were 205->204 respectively. Utox was positive for Cocaine, marijuana and Fentanyl.  Patient to be admitted observation for CP and HTN emergency.

## 2024-04-25 NOTE — PROGRESS NOTES
Pharmacy Medication History Review    Christiano eSlf is a 58 y.o. male admitted for Chest pain. Pharmacy reviewed the patient's rzysn-oa-yabfuftep medications and allergies for accuracy.    Medications ADDED:  N/A  Medications CHANGED:  N/A  Medications REMOVED:   Voltaren gel         The list below reflects the updated PTA list. Comments regarding how patient may be taking medications differently can be found in the Admit Orders Activity  Prior to Admission Medications   Prescriptions Last Dose Informant Patient Reported? Taking?   FLUoxetine (PROzac) 20 mg capsule 4/24/2024 Self Yes Yes   Sig: Take 1 capsule (20 mg) by mouth once daily.   amLODIPine (Norvasc) 5 mg tablet 4/24/2024 Self Yes Yes   Sig: Take 1 tablet (5 mg) by mouth once daily.   baclofen (Lioresal) 10 mg tablet 4/24/2024 Self Yes Yes   Sig: Take 1 tablet (10 mg) by mouth 3 times a day.   chlorthalidone (Hygroton) 25 mg tablet 4/24/2024 Self Yes Yes   Sig: Take 1 tablet (25 mg) by mouth once daily.   doxepin (SINEquan) 100 mg capsule 4/24/2024 Self Yes Yes   Sig: Take 1 capsule (100 mg) by mouth once daily at bedtime.   ergocalciferol (Vitamin D-2) 1.25 MG (69223 UT) capsule Unknown Self Yes Yes   Sig: Take 1 capsule (50,000 Units) by mouth 1 (one) time per week.   finasteride (Proscar) 5 mg tablet Unknown Self Yes No   Sig: Take 1 tablet (5 mg) by mouth once daily.   folic acid (Folvite) 1 mg tablet Unknown Self Yes Yes   Sig: Take 1 tablet (1 mg) by mouth once daily.   gabapentin (Neurontin) 600 mg tablet 4/24/2024 Self Yes Yes   Sig: Take 1 tablet (600 mg) by mouth 3 times a day.   lisinopril 20 mg tablet 4/24/2024 Self Yes Yes   Sig: Take 1 tablet (20 mg) by mouth once daily.   melatonin 3 mg tablet 4/24/2024 Self Yes Yes   Sig: Take 1 tablet (3 mg) by mouth once daily at bedtime.   metoprolol succinate XL (Toprol-XL) 50 mg 24 hr tablet 4/24/2024 Self Yes Yes   Sig: Take 1 tablet (50 mg) by mouth once daily.   omeprazole (PriLOSEC) 20 mg DR  capsule 4/24/2024 Self Yes Yes   Sig: Take 1 capsule (20 mg) by mouth once daily in the morning. Take before meals.   oxybutynin XL (Ditropan-XL) 15 mg 24 hr tablet 4/24/2024 Self Yes Yes   Sig: Take 1 tablet (15 mg) by mouth once daily.   tamsulosin (Flomax) 0.4 mg 24 hr capsule 4/24/2024 Self Yes Yes   Sig: Take 1 capsule (0.4 mg) by mouth once daily.   thiamine 100 mg tablet Unknown Self Yes Yes   Sig: Take 1 tablet (100 mg) by mouth once daily.   timolol (Timoptic) 0.5 % ophthalmic solution 4/24/2024 Self Yes Yes   Sig: Administer 1 drop into both eyes once daily.   traZODone (Desyrel) 100 mg tablet 4/24/2024 Self Yes Yes   Sig: Take 1 tablet (100 mg) by mouth once daily at bedtime.   Aspirin 81 mg tablet  Sig: Take 1 tablet (81 mg) by mouth once daily      Facility-Administered Medications Last Administration Doses Remaining   perflutren lipid microspheres (Definity) injection 0.5-10 mL of dilution None recorded 1   perflutren lipid microspheres (Definity) injection 0.5-10 mL of dilution None recorded 1           The list below reflects the updated allergy list. Please review each documented allergy for additional clarification and justification.  Allergies  Reviewed by Cate Francisco on 4/25/2024   No Known Allergies         Patient accepts M2B at discharge. Pharmacy has been updated to [facility].    Sources used to complete the med history include out patient fill history, OARRS, and patient interview. Patient was unsure of medications that he takes. He refers to medication packaging that comes in mail order.    Below are additional concerns with the patient's PTA list.  Folic acid- No fill history    Cate [facility]  Transitions of Care Pharmacist  [facility]  Please reach out via Secure Chat for questions, or if no response call [employer] or [employer]

## 2024-04-26 LAB
ALBUMIN SERPL BCP-MCNC: 3.7 G/DL (ref 3.4–5)
ANION GAP SERPL CALC-SCNC: 14 MMOL/L (ref 10–20)
BUN SERPL-MCNC: 25 MG/DL (ref 6–23)
CALCIUM SERPL-MCNC: 9.2 MG/DL (ref 8.6–10.6)
CHLORIDE SERPL-SCNC: 102 MMOL/L (ref 98–107)
CO2 SERPL-SCNC: 26 MMOL/L (ref 21–32)
CREAT SERPL-MCNC: 1.71 MG/DL (ref 0.5–1.3)
EGFRCR SERPLBLD CKD-EPI 2021: 46 ML/MIN/1.73M*2
ERYTHROCYTE [DISTWIDTH] IN BLOOD BY AUTOMATED COUNT: 14.1 % (ref 11.5–14.5)
GLUCOSE SERPL-MCNC: 91 MG/DL (ref 74–99)
HCT VFR BLD AUTO: 32.2 % (ref 41–52)
HGB BLD-MCNC: 10.9 G/DL (ref 13.5–17.5)
MAGNESIUM SERPL-MCNC: 2.04 MG/DL (ref 1.6–2.4)
MCH RBC QN AUTO: 23.6 PG (ref 26–34)
MCHC RBC AUTO-ENTMCNC: 33.9 G/DL (ref 32–36)
MCV RBC AUTO: 70 FL (ref 80–100)
NRBC BLD-RTO: 0 /100 WBCS (ref 0–0)
PHOSPHATE SERPL-MCNC: 4.2 MG/DL (ref 2.5–4.9)
PLATELET # BLD AUTO: 176 X10*3/UL (ref 150–450)
POTASSIUM SERPL-SCNC: 3.7 MMOL/L (ref 3.5–5.3)
RBC # BLD AUTO: 4.62 X10*6/UL (ref 4.5–5.9)
SODIUM SERPL-SCNC: 138 MMOL/L (ref 136–145)
WBC # BLD AUTO: 4.1 X10*3/UL (ref 4.4–11.3)

## 2024-04-26 PROCEDURE — 85027 COMPLETE CBC AUTOMATED: CPT | Performed by: INTERNAL MEDICINE

## 2024-04-26 PROCEDURE — 2500000004 HC RX 250 GENERAL PHARMACY W/ HCPCS (ALT 636 FOR OP/ED): Mod: SE | Performed by: NURSE PRACTITIONER

## 2024-04-26 PROCEDURE — 2500000006 HC RX 250 W HCPCS SELF ADMINISTERED DRUGS (ALT 637 FOR ALL PAYERS): Mod: SE | Performed by: NURSE PRACTITIONER

## 2024-04-26 PROCEDURE — 2500000004 HC RX 250 GENERAL PHARMACY W/ HCPCS (ALT 636 FOR OP/ED): Mod: SE | Performed by: INTERNAL MEDICINE

## 2024-04-26 PROCEDURE — 96376 TX/PRO/DX INJ SAME DRUG ADON: CPT

## 2024-04-26 PROCEDURE — 2500000001 HC RX 250 WO HCPCS SELF ADMINISTERED DRUGS (ALT 637 FOR MEDICARE OP): Mod: SE | Performed by: NURSE PRACTITIONER

## 2024-04-26 PROCEDURE — 84100 ASSAY OF PHOSPHORUS: CPT | Performed by: INTERNAL MEDICINE

## 2024-04-26 PROCEDURE — 36415 COLL VENOUS BLD VENIPUNCTURE: CPT | Performed by: INTERNAL MEDICINE

## 2024-04-26 PROCEDURE — G0378 HOSPITAL OBSERVATION PER HR: HCPCS

## 2024-04-26 PROCEDURE — 83735 ASSAY OF MAGNESIUM: CPT | Performed by: INTERNAL MEDICINE

## 2024-04-26 PROCEDURE — 96372 THER/PROPH/DIAG INJ SC/IM: CPT | Performed by: NURSE PRACTITIONER

## 2024-04-26 PROCEDURE — 99232 SBSQ HOSP IP/OBS MODERATE 35: CPT | Performed by: INTERNAL MEDICINE

## 2024-04-26 PROCEDURE — 96361 HYDRATE IV INFUSION ADD-ON: CPT

## 2024-04-26 RX ADMIN — GUAIFENESIN 600 MG: 600 TABLET ORAL at 20:54

## 2024-04-26 RX ADMIN — DICLOFENAC 4 G: 10 GEL TOPICAL at 13:00

## 2024-04-26 RX ADMIN — GABAPENTIN 600 MG: 300 CAPSULE ORAL at 16:23

## 2024-04-26 RX ADMIN — ASPIRIN 81 MG: 81 TABLET, COATED ORAL at 08:07

## 2024-04-26 RX ADMIN — SODIUM CHLORIDE 500 ML: 9 INJECTION, SOLUTION INTRAVENOUS at 09:21

## 2024-04-26 RX ADMIN — Medication 1 TABLET: at 08:07

## 2024-04-26 RX ADMIN — GUAIFENESIN 600 MG: 600 TABLET ORAL at 08:07

## 2024-04-26 RX ADMIN — FOLIC ACID 1 MG: 1 TABLET ORAL at 08:07

## 2024-04-26 RX ADMIN — ACETAMINOPHEN 975 MG: 325 TABLET ORAL at 08:06

## 2024-04-26 RX ADMIN — PANTOPRAZOLE SODIUM 40 MG: 40 TABLET, DELAYED RELEASE ORAL at 08:08

## 2024-04-26 RX ADMIN — GABAPENTIN 600 MG: 300 CAPSULE ORAL at 08:07

## 2024-04-26 RX ADMIN — TRAZODONE HYDROCHLORIDE 100 MG: 50 TABLET ORAL at 20:53

## 2024-04-26 RX ADMIN — OXYBUTYNIN CHLORIDE 5 MG: 5 TABLET ORAL at 16:23

## 2024-04-26 RX ADMIN — THIAMINE HYDROCHLORIDE 100 MG: 100 INJECTION, SOLUTION INTRAMUSCULAR; INTRAVENOUS at 08:06

## 2024-04-26 RX ADMIN — BACLOFEN 10 MG: 10 TABLET ORAL at 20:54

## 2024-04-26 RX ADMIN — BACLOFEN 10 MG: 10 TABLET ORAL at 08:07

## 2024-04-26 RX ADMIN — TIMOLOL MALEATE 1 DROP: 5 SOLUTION/ DROPS OPHTHALMIC at 08:08

## 2024-04-26 RX ADMIN — MELATONIN 3 MG: 3 TAB ORAL at 20:54

## 2024-04-26 RX ADMIN — FLUOXETINE 20 MG: 20 CAPSULE ORAL at 08:06

## 2024-04-26 RX ADMIN — BACLOFEN 10 MG: 10 TABLET ORAL at 16:23

## 2024-04-26 RX ADMIN — OXYBUTYNIN CHLORIDE 5 MG: 5 TABLET ORAL at 08:08

## 2024-04-26 RX ADMIN — FINASTERIDE 5 MG: 5 TABLET, FILM COATED ORAL at 08:07

## 2024-04-26 RX ADMIN — SENNOSIDES AND DOCUSATE SODIUM 2 TABLET: 8.6; 5 TABLET ORAL at 20:53

## 2024-04-26 RX ADMIN — TAMSULOSIN HYDROCHLORIDE 0.4 MG: 0.4 CAPSULE ORAL at 08:07

## 2024-04-26 RX ADMIN — GABAPENTIN 600 MG: 300 CAPSULE ORAL at 20:54

## 2024-04-26 RX ADMIN — ENOXAPARIN SODIUM 40 MG: 100 INJECTION SUBCUTANEOUS at 10:28

## 2024-04-26 RX ADMIN — ACETAMINOPHEN 975 MG: 325 TABLET ORAL at 17:57

## 2024-04-26 ASSESSMENT — LIFESTYLE VARIABLES
BLOOD PRESSURE: 91/58
TREMOR: NO TREMOR
PAROXYSMAL SWEATS: NO SWEAT VISIBLE
AUDITORY DISTURBANCES: NOT PRESENT
ANXIETY: NO ANXIETY, AT EASE
AUDITORY DISTURBANCES: NOT PRESENT
AGITATION: NORMAL ACTIVITY
PULSE: 60
BLOOD PRESSURE: 91/54
VISUAL DISTURBANCES: NOT PRESENT
AGITATION: NORMAL ACTIVITY
AUDITORY DISTURBANCES: NOT PRESENT
PULSE: 70
PAROXYSMAL SWEATS: NO SWEAT VISIBLE
ANXIETY: NO ANXIETY, AT EASE
TOTAL SCORE: 0
TOTAL SCORE: 0
AGITATION: NORMAL ACTIVITY
ORIENTATION AND CLOUDING OF SENSORIUM: ORIENTED AND CAN DO SERIAL ADDITIONS
BLOOD PRESSURE: 108/64
PAROXYSMAL SWEATS: NO SWEAT VISIBLE
AUDITORY DISTURBANCES: NOT PRESENT
VISUAL DISTURBANCES: NOT PRESENT
ANXIETY: NO ANXIETY, AT EASE
HEADACHE, FULLNESS IN HEAD: NOT PRESENT
ORIENTATION AND CLOUDING OF SENSORIUM: ORIENTED AND CAN DO SERIAL ADDITIONS
TREMOR: NO TREMOR
TREMOR: NO TREMOR
VISUAL DISTURBANCES: NOT PRESENT
ORIENTATION AND CLOUDING OF SENSORIUM: ORIENTED AND CAN DO SERIAL ADDITIONS
HEADACHE, FULLNESS IN HEAD: NOT PRESENT
NAUSEA AND VOMITING: NO NAUSEA AND NO VOMITING
PAROXYSMAL SWEATS: NO SWEAT VISIBLE
PULSE: 63
TREMOR: NO TREMOR
BLOOD PRESSURE: 100/79
TOTAL SCORE: 0
PULSE: 64
VISUAL DISTURBANCES: NOT PRESENT
HEADACHE, FULLNESS IN HEAD: NOT PRESENT
PULSE: 56
ORIENTATION AND CLOUDING OF SENSORIUM: ORIENTED AND CAN DO SERIAL ADDITIONS
ANXIETY: NO ANXIETY, AT EASE
PAROXYSMAL SWEATS: NO SWEAT VISIBLE
BLOOD PRESSURE: 94/67
NAUSEA AND VOMITING: NO NAUSEA AND NO VOMITING
TOTAL SCORE: 0
BLOOD PRESSURE: 91/66
ANXIETY: NO ANXIETY, AT EASE
TOTAL SCORE: 0
AGITATION: NORMAL ACTIVITY
AGITATION: NORMAL ACTIVITY
ANXIETY: NO ANXIETY, AT EASE
NAUSEA AND VOMITING: NO NAUSEA AND NO VOMITING
PULSE: 61
NAUSEA AND VOMITING: NO NAUSEA AND NO VOMITING
NAUSEA AND VOMITING: NO NAUSEA AND NO VOMITING
ORIENTATION AND CLOUDING OF SENSORIUM: ORIENTED AND CAN DO SERIAL ADDITIONS
HEADACHE, FULLNESS IN HEAD: NOT PRESENT
NAUSEA AND VOMITING: NO NAUSEA AND NO VOMITING
TREMOR: NO TREMOR
AUDITORY DISTURBANCES: NOT PRESENT
HEADACHE, FULLNESS IN HEAD: NOT PRESENT
TREMOR: NO TREMOR
HEADACHE, FULLNESS IN HEAD: NOT PRESENT
AGITATION: NORMAL ACTIVITY
AUDITORY DISTURBANCES: NOT PRESENT
ORIENTATION AND CLOUDING OF SENSORIUM: ORIENTED AND CAN DO SERIAL ADDITIONS
PAROXYSMAL SWEATS: NO SWEAT VISIBLE
TOTAL SCORE: 0

## 2024-04-26 ASSESSMENT — PAIN SCALES - GENERAL: PAINLEVEL_OUTOF10: 0 - NO PAIN

## 2024-04-27 LAB
ALBUMIN SERPL BCP-MCNC: 3.7 G/DL (ref 3.4–5)
ANION GAP SERPL CALC-SCNC: 13 MMOL/L (ref 10–20)
BUN SERPL-MCNC: 34 MG/DL (ref 6–23)
CALCIUM SERPL-MCNC: 9.3 MG/DL (ref 8.6–10.6)
CARDIAC TROPONIN I PNL SERPL HS: 131 NG/L (ref 0–53)
CHLORIDE SERPL-SCNC: 105 MMOL/L (ref 98–107)
CO2 SERPL-SCNC: 23 MMOL/L (ref 21–32)
CREAT SERPL-MCNC: 1.46 MG/DL (ref 0.5–1.3)
EGFRCR SERPLBLD CKD-EPI 2021: 55 ML/MIN/1.73M*2
ERYTHROCYTE [DISTWIDTH] IN BLOOD BY AUTOMATED COUNT: 14.7 % (ref 11.5–14.5)
GLUCOSE SERPL-MCNC: 113 MG/DL (ref 74–99)
HCT VFR BLD AUTO: 33.4 % (ref 41–52)
HGB BLD-MCNC: 10.6 G/DL (ref 13.5–17.5)
MCH RBC QN AUTO: 23.2 PG (ref 26–34)
MCHC RBC AUTO-ENTMCNC: 31.7 G/DL (ref 32–36)
MCV RBC AUTO: 73 FL (ref 80–100)
NRBC BLD-RTO: 0 /100 WBCS (ref 0–0)
PHOSPHATE SERPL-MCNC: 4.2 MG/DL (ref 2.5–4.9)
PLATELET # BLD AUTO: 172 X10*3/UL (ref 150–450)
POTASSIUM SERPL-SCNC: 4.2 MMOL/L (ref 3.5–5.3)
RBC # BLD AUTO: 4.57 X10*6/UL (ref 4.5–5.9)
SODIUM SERPL-SCNC: 137 MMOL/L (ref 136–145)
WBC # BLD AUTO: 3.8 X10*3/UL (ref 4.4–11.3)

## 2024-04-27 PROCEDURE — 85027 COMPLETE CBC AUTOMATED: CPT | Performed by: INTERNAL MEDICINE

## 2024-04-27 PROCEDURE — 84484 ASSAY OF TROPONIN QUANT: CPT | Performed by: INTERNAL MEDICINE

## 2024-04-27 PROCEDURE — 99232 SBSQ HOSP IP/OBS MODERATE 35: CPT | Performed by: INTERNAL MEDICINE

## 2024-04-27 PROCEDURE — 2500000001 HC RX 250 WO HCPCS SELF ADMINISTERED DRUGS (ALT 637 FOR MEDICARE OP): Mod: SE | Performed by: INTERNAL MEDICINE

## 2024-04-27 PROCEDURE — 2500000004 HC RX 250 GENERAL PHARMACY W/ HCPCS (ALT 636 FOR OP/ED): Mod: SE | Performed by: NURSE PRACTITIONER

## 2024-04-27 PROCEDURE — 80069 RENAL FUNCTION PANEL: CPT | Performed by: INTERNAL MEDICINE

## 2024-04-27 PROCEDURE — 2500000005 HC RX 250 GENERAL PHARMACY W/O HCPCS: Mod: SE | Performed by: STUDENT IN AN ORGANIZED HEALTH CARE EDUCATION/TRAINING PROGRAM

## 2024-04-27 PROCEDURE — 2500000001 HC RX 250 WO HCPCS SELF ADMINISTERED DRUGS (ALT 637 FOR MEDICARE OP): Mod: SE | Performed by: NURSE PRACTITIONER

## 2024-04-27 PROCEDURE — 96372 THER/PROPH/DIAG INJ SC/IM: CPT | Performed by: NURSE PRACTITIONER

## 2024-04-27 PROCEDURE — G0378 HOSPITAL OBSERVATION PER HR: HCPCS

## 2024-04-27 PROCEDURE — 96376 TX/PRO/DX INJ SAME DRUG ADON: CPT

## 2024-04-27 PROCEDURE — 2500000006 HC RX 250 W HCPCS SELF ADMINISTERED DRUGS (ALT 637 FOR ALL PAYERS): Mod: SE | Performed by: NURSE PRACTITIONER

## 2024-04-27 PROCEDURE — 2500000004 HC RX 250 GENERAL PHARMACY W/ HCPCS (ALT 636 FOR OP/ED): Mod: SE | Performed by: INTERNAL MEDICINE

## 2024-04-27 PROCEDURE — 36415 COLL VENOUS BLD VENIPUNCTURE: CPT | Performed by: INTERNAL MEDICINE

## 2024-04-27 RX ORDER — ACETAMINOPHEN 325 MG/1
650 TABLET ORAL EVERY 6 HOURS PRN
Status: DISCONTINUED | OUTPATIENT
Start: 2024-04-27 | End: 2024-04-28 | Stop reason: HOSPADM

## 2024-04-27 RX ORDER — SODIUM CHLORIDE 9 MG/ML
100 INJECTION, SOLUTION INTRAVENOUS CONTINUOUS
Status: DISCONTINUED | OUTPATIENT
Start: 2024-04-27 | End: 2024-04-28 | Stop reason: HOSPADM

## 2024-04-27 RX ORDER — AMOXICILLIN AND CLAVULANATE POTASSIUM 875; 125 MG/1; MG/1
1 TABLET, FILM COATED ORAL EVERY 12 HOURS SCHEDULED
Status: DISCONTINUED | OUTPATIENT
Start: 2024-04-27 | End: 2024-04-28 | Stop reason: HOSPADM

## 2024-04-27 RX ORDER — LIDOCAINE 560 MG/1
1 PATCH PERCUTANEOUS; TOPICAL; TRANSDERMAL DAILY
Status: DISCONTINUED | OUTPATIENT
Start: 2024-04-27 | End: 2024-04-28 | Stop reason: HOSPADM

## 2024-04-27 RX ORDER — AMOXICILLIN AND CLAVULANATE POTASSIUM 500; 125 MG/1; MG/1
1 TABLET, FILM COATED ORAL EVERY 12 HOURS SCHEDULED
Status: DISCONTINUED | OUTPATIENT
Start: 2024-04-27 | End: 2024-04-27

## 2024-04-27 RX ADMIN — OXYBUTYNIN CHLORIDE 5 MG: 5 TABLET ORAL at 15:16

## 2024-04-27 RX ADMIN — TAMSULOSIN HYDROCHLORIDE 0.4 MG: 0.4 CAPSULE ORAL at 09:17

## 2024-04-27 RX ADMIN — AMOXICILLIN AND CLAVULANATE POTASSIUM 1 TABLET: 875; 125 TABLET, FILM COATED ORAL at 21:36

## 2024-04-27 RX ADMIN — FINASTERIDE 5 MG: 5 TABLET, FILM COATED ORAL at 09:17

## 2024-04-27 RX ADMIN — MELATONIN 3 MG: 3 TAB ORAL at 21:36

## 2024-04-27 RX ADMIN — FOLIC ACID 1 MG: 1 TABLET ORAL at 09:17

## 2024-04-27 RX ADMIN — OXYBUTYNIN CHLORIDE 5 MG: 5 TABLET ORAL at 21:39

## 2024-04-27 RX ADMIN — BENZONATATE 100 MG: 100 CAPSULE ORAL at 09:18

## 2024-04-27 RX ADMIN — SENNOSIDES AND DOCUSATE SODIUM 2 TABLET: 8.6; 5 TABLET ORAL at 09:17

## 2024-04-27 RX ADMIN — DOXEPIN HYDROCHLORIDE 100 MG: 50 CAPSULE ORAL at 21:36

## 2024-04-27 RX ADMIN — TRAZODONE HYDROCHLORIDE 100 MG: 50 TABLET ORAL at 21:35

## 2024-04-27 RX ADMIN — PANTOPRAZOLE SODIUM 40 MG: 40 TABLET, DELAYED RELEASE ORAL at 06:14

## 2024-04-27 RX ADMIN — BACLOFEN 10 MG: 10 TABLET ORAL at 15:16

## 2024-04-27 RX ADMIN — AMLODIPINE BESYLATE 5 MG: 5 TABLET ORAL at 09:17

## 2024-04-27 RX ADMIN — CHLORTHALIDONE 25 MG: 25 TABLET ORAL at 09:17

## 2024-04-27 RX ADMIN — LIDOCAINE 1 PATCH: 4 PATCH TOPICAL at 03:12

## 2024-04-27 RX ADMIN — BACLOFEN 10 MG: 10 TABLET ORAL at 09:17

## 2024-04-27 RX ADMIN — GABAPENTIN 600 MG: 300 CAPSULE ORAL at 21:35

## 2024-04-27 RX ADMIN — METOPROLOL SUCCINATE 50 MG: 50 TABLET, EXTENDED RELEASE ORAL at 09:17

## 2024-04-27 RX ADMIN — ASPIRIN 81 MG: 81 TABLET, COATED ORAL at 09:17

## 2024-04-27 RX ADMIN — SODIUM CHLORIDE 75 ML/HR: 9 INJECTION, SOLUTION INTRAVENOUS at 21:37

## 2024-04-27 RX ADMIN — GUAIFENESIN 600 MG: 600 TABLET ORAL at 21:35

## 2024-04-27 RX ADMIN — Medication: at 21:37

## 2024-04-27 RX ADMIN — GABAPENTIN 600 MG: 300 CAPSULE ORAL at 15:16

## 2024-04-27 RX ADMIN — GABAPENTIN 600 MG: 300 CAPSULE ORAL at 09:17

## 2024-04-27 RX ADMIN — FLUOXETINE 20 MG: 20 CAPSULE ORAL at 09:17

## 2024-04-27 RX ADMIN — SENNOSIDES AND DOCUSATE SODIUM 2 TABLET: 8.6; 5 TABLET ORAL at 21:36

## 2024-04-27 RX ADMIN — THIAMINE HYDROCHLORIDE 100 MG: 100 INJECTION, SOLUTION INTRAMUSCULAR; INTRAVENOUS at 09:18

## 2024-04-27 RX ADMIN — GUAIFENESIN 600 MG: 600 TABLET ORAL at 09:17

## 2024-04-27 RX ADMIN — ENOXAPARIN SODIUM 40 MG: 100 INJECTION SUBCUTANEOUS at 10:17

## 2024-04-27 RX ADMIN — BENZONATATE 100 MG: 100 CAPSULE ORAL at 15:16

## 2024-04-27 RX ADMIN — Medication 1 TABLET: at 09:17

## 2024-04-27 RX ADMIN — OXYBUTYNIN CHLORIDE 5 MG: 5 TABLET ORAL at 09:17

## 2024-04-27 RX ADMIN — ACETAMINOPHEN 650 MG: 325 TABLET ORAL at 09:18

## 2024-04-27 SDOH — SOCIAL STABILITY: SOCIAL INSECURITY: HAVE YOU HAD THOUGHTS OF HARMING ANYONE ELSE?: NO

## 2024-04-27 SDOH — SOCIAL STABILITY: SOCIAL INSECURITY: DO YOU FEEL ANYONE HAS EXPLOITED OR TAKEN ADVANTAGE OF YOU FINANCIALLY OR OF YOUR PERSONAL PROPERTY?: NO

## 2024-04-27 SDOH — SOCIAL STABILITY: SOCIAL INSECURITY: ARE YOU OR HAVE YOU BEEN THREATENED OR ABUSED PHYSICALLY, EMOTIONALLY, OR SEXUALLY BY ANYONE?: NO

## 2024-04-27 SDOH — SOCIAL STABILITY: SOCIAL INSECURITY: DO YOU FEEL UNSAFE GOING BACK TO THE PLACE WHERE YOU ARE LIVING?: NO

## 2024-04-27 SDOH — SOCIAL STABILITY: SOCIAL INSECURITY: HAVE YOU HAD ANY THOUGHTS OF HARMING ANYONE ELSE?: NO

## 2024-04-27 SDOH — SOCIAL STABILITY: SOCIAL INSECURITY: DOES ANYONE TRY TO KEEP YOU FROM HAVING/CONTACTING OTHER FRIENDS OR DOING THINGS OUTSIDE YOUR HOME?: NO

## 2024-04-27 SDOH — SOCIAL STABILITY: SOCIAL INSECURITY: ARE THERE ANY APPARENT SIGNS OF INJURIES/BEHAVIORS THAT COULD BE RELATED TO ABUSE/NEGLECT?: NO

## 2024-04-27 SDOH — SOCIAL STABILITY: SOCIAL INSECURITY: ABUSE: ADULT

## 2024-04-27 SDOH — SOCIAL STABILITY: SOCIAL INSECURITY: HAS ANYONE EVER THREATENED TO HURT YOUR FAMILY OR YOUR PETS?: NO

## 2024-04-27 SDOH — SOCIAL STABILITY: SOCIAL INSECURITY: WERE YOU ABLE TO COMPLETE ALL THE BEHAVIORAL HEALTH SCREENINGS?: YES

## 2024-04-27 ASSESSMENT — PAIN - FUNCTIONAL ASSESSMENT
PAIN_FUNCTIONAL_ASSESSMENT: 0-10

## 2024-04-27 ASSESSMENT — PAIN SCALES - GENERAL
PAINLEVEL_OUTOF10: 6
PAINLEVEL_OUTOF10: 7
PAINLEVEL_OUTOF10: 7
PAINLEVEL_OUTOF10: 0 - NO PAIN
PAINLEVEL_OUTOF10: 0 - NO PAIN
PAINLEVEL_OUTOF10: 7
PAINLEVEL_OUTOF10: 5 - MODERATE PAIN
PAINLEVEL_OUTOF10: 6
PAINLEVEL_OUTOF10: 0 - NO PAIN

## 2024-04-27 ASSESSMENT — LIFESTYLE VARIABLES
VISUAL DISTURBANCES: NOT PRESENT
HEADACHE, FULLNESS IN HEAD: NOT PRESENT
HOW OFTEN DO YOU HAVE 6 OR MORE DRINKS ON ONE OCCASION: NEVER
NAUSEA AND VOMITING: NO NAUSEA AND NO VOMITING
AUDITORY DISTURBANCES: NOT PRESENT
ORIENTATION AND CLOUDING OF SENSORIUM: ORIENTED AND CAN DO SERIAL ADDITIONS
AUDITORY DISTURBANCES: NOT PRESENT
TOTAL SCORE: 0
ANXIETY: NO ANXIETY, AT EASE
ORIENTATION AND CLOUDING OF SENSORIUM: ORIENTED AND CAN DO SERIAL ADDITIONS
AUDITORY DISTURBANCES: NOT PRESENT
ANXIETY: NO ANXIETY, AT EASE
HEADACHE, FULLNESS IN HEAD: NOT PRESENT
TOTAL SCORE: 0
TOTAL SCORE: 0
VISUAL DISTURBANCES: NOT PRESENT
TREMOR: NO TREMOR
PAROXYSMAL SWEATS: NO SWEAT VISIBLE
PAROXYSMAL SWEATS: NO SWEAT VISIBLE
NAUSEA AND VOMITING: NO NAUSEA AND NO VOMITING
HEADACHE, FULLNESS IN HEAD: NOT PRESENT
TOTAL SCORE: 0
ANXIETY: NO ANXIETY, AT EASE
AUDITORY DISTURBANCES: NOT PRESENT
AUDITORY DISTURBANCES: NOT PRESENT
TOTAL SCORE: 0
PAROXYSMAL SWEATS: NO SWEAT VISIBLE
TREMOR: NO TREMOR
NAUSEA AND VOMITING: NO NAUSEA AND NO VOMITING
TREMOR: NO TREMOR
TREMOR: NO TREMOR
AUDIT-C TOTAL SCORE: 3
VISUAL DISTURBANCES: NOT PRESENT
AGITATION: NORMAL ACTIVITY
PAROXYSMAL SWEATS: NO SWEAT VISIBLE
AGITATION: NORMAL ACTIVITY
NAUSEA AND VOMITING: NO NAUSEA AND NO VOMITING
ANXIETY: NO ANXIETY, AT EASE
SUBSTANCE_ABUSE_PAST_12_MONTHS: YES
ANXIETY: NO ANXIETY, AT EASE
VISUAL DISTURBANCES: NOT PRESENT
ANXIETY: NO ANXIETY, AT EASE
ORIENTATION AND CLOUDING OF SENSORIUM: ORIENTED AND CAN DO SERIAL ADDITIONS
ORIENTATION AND CLOUDING OF SENSORIUM: ORIENTED AND CAN DO SERIAL ADDITIONS
ANXIETY: NO ANXIETY, AT EASE
AGITATION: NORMAL ACTIVITY
PAROXYSMAL SWEATS: NO SWEAT VISIBLE
HEADACHE, FULLNESS IN HEAD: NOT PRESENT
TREMOR: NO TREMOR
NAUSEA AND VOMITING: NO NAUSEA AND NO VOMITING
ANXIETY: NO ANXIETY, AT EASE
PRESCIPTION_ABUSE_PAST_12_MONTHS: NO
TOTAL SCORE: 0
ORIENTATION AND CLOUDING OF SENSORIUM: ORIENTED AND CAN DO SERIAL ADDITIONS
TOTAL SCORE: 0
NAUSEA AND VOMITING: NO NAUSEA AND NO VOMITING
SKIP TO QUESTIONS 9-10: 1
ORIENTATION AND CLOUDING OF SENSORIUM: ORIENTED AND CAN DO SERIAL ADDITIONS
NAUSEA AND VOMITING: NO NAUSEA AND NO VOMITING
TREMOR: NO TREMOR
AGITATION: NORMAL ACTIVITY
ORIENTATION AND CLOUDING OF SENSORIUM: ORIENTED AND CAN DO SERIAL ADDITIONS
PAROXYSMAL SWEATS: 2
AGITATION: NORMAL ACTIVITY
VISUAL DISTURBANCES: NOT PRESENT
HEADACHE, FULLNESS IN HEAD: NOT PRESENT
VISUAL DISTURBANCES: NOT PRESENT
ORIENTATION AND CLOUDING OF SENSORIUM: ORIENTED AND CAN DO SERIAL ADDITIONS
PAROXYSMAL SWEATS: NO SWEAT VISIBLE
HOW MANY STANDARD DRINKS CONTAINING ALCOHOL DO YOU HAVE ON A TYPICAL DAY: 1 OR 2
AUDITORY DISTURBANCES: NOT PRESENT
AGITATION: NORMAL ACTIVITY
NAUSEA AND VOMITING: NO NAUSEA AND NO VOMITING
HEADACHE, FULLNESS IN HEAD: NOT PRESENT
VISUAL DISTURBANCES: NOT PRESENT
HOW OFTEN DO YOU HAVE A DRINK CONTAINING ALCOHOL: 2-3 TIMES A WEEK
AUDITORY DISTURBANCES: NOT PRESENT
AGITATION: NORMAL ACTIVITY
TREMOR: NO TREMOR
TREMOR: NO TREMOR
VISUAL DISTURBANCES: NOT PRESENT
TOTAL SCORE: 2
PAROXYSMAL SWEATS: NO SWEAT VISIBLE
AUDITORY DISTURBANCES: NOT PRESENT
HEADACHE, FULLNESS IN HEAD: NOT PRESENT
HEADACHE, FULLNESS IN HEAD: NOT PRESENT
AUDIT-C TOTAL SCORE: 3
AGITATION: NORMAL ACTIVITY

## 2024-04-27 ASSESSMENT — COGNITIVE AND FUNCTIONAL STATUS - GENERAL
MOBILITY SCORE: 22
MOBILITY SCORE: 22
CLIMB 3 TO 5 STEPS WITH RAILING: A LITTLE
WALKING IN HOSPITAL ROOM: A LITTLE
DAILY ACTIVITIY SCORE: 24
MOBILITY SCORE: 22
DAILY ACTIVITIY SCORE: 24
CLIMB 3 TO 5 STEPS WITH RAILING: A LITTLE
PATIENT BASELINE BEDBOUND: NO
DAILY ACTIVITIY SCORE: 24
WALKING IN HOSPITAL ROOM: A LITTLE
CLIMB 3 TO 5 STEPS WITH RAILING: A LITTLE
WALKING IN HOSPITAL ROOM: A LITTLE

## 2024-04-27 ASSESSMENT — ACTIVITIES OF DAILY LIVING (ADL)
HEARING - RIGHT EAR: FUNCTIONAL
WALKS IN HOME: NEEDS ASSISTANCE
DRESSING YOURSELF: INDEPENDENT
HEARING - LEFT EAR: FUNCTIONAL
BATHING: INDEPENDENT
PATIENT'S MEMORY ADEQUATE TO SAFELY COMPLETE DAILY ACTIVITIES?: YES
ADEQUATE_TO_COMPLETE_ADL: YES
LACK_OF_TRANSPORTATION: NO
TOILETING: INDEPENDENT
JUDGMENT_ADEQUATE_SAFELY_COMPLETE_DAILY_ACTIVITIES: YES
FEEDING YOURSELF: INDEPENDENT
GROOMING: INDEPENDENT

## 2024-04-27 ASSESSMENT — PAIN DESCRIPTION - LOCATION
LOCATION: CHEST
LOCATION: CHEST

## 2024-04-27 ASSESSMENT — PAIN DESCRIPTION - ORIENTATION: ORIENTATION: LEFT

## 2024-04-27 ASSESSMENT — PATIENT HEALTH QUESTIONNAIRE - PHQ9
2. FEELING DOWN, DEPRESSED OR HOPELESS: NOT AT ALL
1. LITTLE INTEREST OR PLEASURE IN DOING THINGS: NOT AT ALL
SUM OF ALL RESPONSES TO PHQ9 QUESTIONS 1 & 2: 0

## 2024-04-27 ASSESSMENT — PAIN SCALES - PAIN ASSESSMENT IN ADVANCED DEMENTIA (PAINAD): TOTALSCORE: MEDICATION (SEE MAR)

## 2024-04-27 NOTE — NURSING NOTE
Nursing note :   Admitted to the floor from the ER. Presented with c/o chest pain. Endorsed used of Alcohol 4/26 and Marijuana week PTA, on Q2 [place]. Patient is A &OX4. Ambulatory with cane. Kept belongings @ the bedside : Cellphone, , cloths, shoes. House keys. Anticipates discharge to previous disposition.  Renetta Méndez RN

## 2024-04-27 NOTE — PROGRESS NOTES
"Christiano Self is a 58 y.o. male on day 0 of admission presenting with Chest pain.    Subjective   Patient was seen and examined at bedside this morning, stated that he was experiencing chest pain in the left precordium, continuous, nonradiating, with palpable tenderness over the ribs, not associated with any other symptoms at that time.  ECG was ordered along with stat troponin.  Patient complained to nursing late p.m. about tooth ache from her cavity in his left lower molar, stated that cavity is chronic in nature, and has not not had a chance to see a dentist.       Objective     Physical Exam  Constitutional: Alert active, cooperative not in acute distress  Eyes: PERRLA, clear sclera  ENMT: Moist mucosal membranes, no exudate.  Gingival edema around second molar with cavity in the lower jaw, with erythema surrounding decayed tooth.  Head / Neck: Atraumatic, normocephalic, supple neck, JVP not visualized  Lungs: Patent airways, CTABL  Heart: RRR, S1S2, no murmurs appreciated, palpable pulses in all extremities  GI: Soft, NT, ND, bowel sounds present in all quadrants  MSK: Moves all extremities freely, no restriction  of ROM, no joint edema  Extremities: Intact x 4, no peripheral edema  : No Arita catheter inserted  Breast: Deferred  Neurological: AAO x 3 to person, place and date, facial muscles symmetrical, sensation intact, strength 4/4, no acute focal neurological deficits appreciated  Psychological: Appropriate mood and behavior     Last Recorded Vitals  Blood pressure 112/66, pulse 68, temperature 36.6 °C (97.9 °F), temperature source Temporal, resp. rate 18, height 1.626 m (5' 4.02\"), SpO2 100%.  Intake/Output last 3 Shifts:  No intake/output data recorded.    Relevant Results              Scheduled medications  amLODIPine, 5 mg, oral, Daily  amoxicillin-pot clavulanate, 1 tablet, oral, q12h RODRIGO  aspirin, 81 mg, oral, Daily  baclofen, 10 mg, oral, TID  chlorthalidone, 25 mg, oral, Daily  diclofenac " sodium, 4 g, Topical, 4x daily  doxepin, 100 mg, oral, Nightly  enoxaparin, 40 mg, subcutaneous, q24h  finasteride, 5 mg, oral, Daily  FLUoxetine, 20 mg, oral, Daily  folic acid, 1 mg, oral, Daily  gabapentin, 600 mg, oral, TID  guaiFENesin, 600 mg, oral, BID  lidocaine, 1 patch, transdermal, Daily  [Held by provider] lisinopril, 20 mg, oral, Daily  melatonin, 3 mg, oral, Nightly  metoprolol succinate XL, 50 mg, oral, Daily  multivitamin with minerals, 1 tablet, oral, Daily  oxybutynin, 5 mg, oral, TID  pantoprazole, 40 mg, oral, Daily before breakfast  sennosides-docusate sodium, 2 tablet, oral, BID  tamsulosin, 0.4 mg, oral, Daily  [START ON 4/28/2024] thiamine, 100 mg, oral, Daily  timolol, 1 drop, Both Eyes, Daily  traZODone, 100 mg, oral, Nightly      Continuous medications  sodium chloride 0.9%, 75 mL/hr      PRN medications  PRN medications: acetaminophen, benzocaine, benzonatate, LORazepam **OR** LORazepam **OR** LORazepam         Assessment/Plan     Mr Self is a 58y male with PMHx: HTN & RCC (S/P Partial Nephrectomy), HFrEF (40-45% EF 06/22), BPH, MS, Treated HCV, Hepatin Steatosis, Cerebellar Stroke (w/ residual unsteadiness), Substance Use Disorder who presented to the ED today with c/o Left sided chest pain that radiates to left shoulder, he describes as a stabbing pain. States it is worse when he coughs. States his cough is dry hacking with occasional moist productive with white sputum. States symptoms for about 3 days. States he has not taken any of his BP meds today he felt too sick.     Principal Problem:    Chest pain    Chest Pain  HTN emergency  HFrEF  -Recurrent chest pain this a.m., no troponin elevation, twelve-lead ECG negative for ischemia  -BP on arrival 192/118->163/103  -Trop in ->204 baseline 127-184  -EKG in ED NSR and baseline for patient  -Telemetry  -daily weights, strict I/O  -Echo : The left ventricular systolic function is mildly decreased, with an estimated ejection  fraction of 40%. There is global hypokinesis of the left ventricle with minor regional variations. The left ventricular cavity size is mildly dilated. There is mildly increased left ventricular posterior wall thickness. There is concentric left ventricular hypertrophy   -c/w home amlodipine 5mg daily  -c/w home aspirin 81mg daily  -c/w home chlorthalidone 25mg daily  -c/w lisinopril 20mg daily  -c/w metop 50mg daily     SHITAL: Likely prerenal azotemia in the setting of dehydration  -Received IV fluid bolus for episode of hypotension, continue normal saline at 75 mill per hour  -Hold lisinopril  -A.m. lab to assess    Left lower molar tooth abscess in the setting of tooth decay  -Augmentin 875-125 mg 1 tablet twice daily, will continue for 7-day course     BPH  -c/w home finasteride 20mg daily  -c/w home tamsulosin 0.4mg daily     MS  -c/w home gabapentin 600mg tid  -c/w home oxybutynin 5mg daily  -c/w home doxepin 100mg daily  -c/w home baclofen 10mg tid     ETOH abuse  Multi drug abuse  Depression/anxiety  insomnia  -CIWA  -folic/MVI/thiamine  -c/w home melatonin 3mg daily  -c/w home trazodine 100mg at bedtime  -c/w home fluoxetine 20mg daily     Fluids: monitor and replete as needed  Electrolytes: monitor and replete as needed  Nutrition: Regular diet   GI prophylaxis: Protonix 40mg QD  DVT prophylaxis: SCD/lovenox  Code Status: Full Code  PCP  Pharmacy     Disposition: Presented with chest pain in the setting of polysubstance abuse, needed further evaluation rule out ACS, anticipated discharge tomorrow.       I spent 40 minutes in the professional and overall care of this patient.      Sandeep Tolbert DO

## 2024-04-27 NOTE — CARE PLAN
The patient's goals for the shift include      The clinical goals for the shift include  : Will have pain controlled and maintained safety during shift

## 2024-04-27 NOTE — CARE PLAN
"  Problem: Fall/Injury  Goal: Not fall by end of shift  Outcome: Progressing     Problem: Fall/Injury  Goal: Be free from injury by end of the shift  Outcome: Progressing     Problem: Pain  Goal: Takes deep breaths with improved pain control throughout the shift  Outcome: Progressing   The patient's goals for the shift include \"Will have help to control this chest pain\"    The clinical goals for the shift include Patient will be free from falls during this shift      "

## 2024-04-28 VITALS
DIASTOLIC BLOOD PRESSURE: 80 MMHG | TEMPERATURE: 97.3 F | HEART RATE: 75 BPM | HEIGHT: 64 IN | SYSTOLIC BLOOD PRESSURE: 136 MMHG | BODY MASS INDEX: 27.79 KG/M2 | OXYGEN SATURATION: 99 % | RESPIRATION RATE: 15 BRPM

## 2024-04-28 LAB
ALBUMIN SERPL BCP-MCNC: 3.6 G/DL (ref 3.4–5)
ANION GAP SERPL CALC-SCNC: 11 MMOL/L (ref 10–20)
ATRIAL RATE: 74 BPM
BUN SERPL-MCNC: 29 MG/DL (ref 6–23)
CALCIUM SERPL-MCNC: 9.3 MG/DL (ref 8.6–10.6)
CHLORIDE SERPL-SCNC: 107 MMOL/L (ref 98–107)
CO2 SERPL-SCNC: 25 MMOL/L (ref 21–32)
CREAT SERPL-MCNC: 1.4 MG/DL (ref 0.5–1.3)
EGFRCR SERPLBLD CKD-EPI 2021: 58 ML/MIN/1.73M*2
ERYTHROCYTE [DISTWIDTH] IN BLOOD BY AUTOMATED COUNT: 14.6 % (ref 11.5–14.5)
GLUCOSE SERPL-MCNC: 90 MG/DL (ref 74–99)
HCT VFR BLD AUTO: 31.6 % (ref 41–52)
HGB BLD-MCNC: 10.3 G/DL (ref 13.5–17.5)
MCH RBC QN AUTO: 23.3 PG (ref 26–34)
MCHC RBC AUTO-ENTMCNC: 32.6 G/DL (ref 32–36)
MCV RBC AUTO: 71 FL (ref 80–100)
NRBC BLD-RTO: 0 /100 WBCS (ref 0–0)
P AXIS: 63 DEGREES
P OFFSET: 205 MS
P ONSET: 149 MS
PHOSPHATE SERPL-MCNC: 2.8 MG/DL (ref 2.5–4.9)
PLATELET # BLD AUTO: 162 X10*3/UL (ref 150–450)
POTASSIUM SERPL-SCNC: 4.2 MMOL/L (ref 3.5–5.3)
PR INTERVAL: 144 MS
Q ONSET: 221 MS
QRS COUNT: 13 BEATS
QRS DURATION: 94 MS
QT INTERVAL: 400 MS
QTC CALCULATION(BAZETT): 444 MS
QTC FREDERICIA: 429 MS
R AXIS: 6 DEGREES
RBC # BLD AUTO: 4.43 X10*6/UL (ref 4.5–5.9)
SODIUM SERPL-SCNC: 139 MMOL/L (ref 136–145)
T AXIS: 66 DEGREES
T OFFSET: 421 MS
VENTRICULAR RATE: 74 BPM
WBC # BLD AUTO: 2.9 X10*3/UL (ref 4.4–11.3)

## 2024-04-28 PROCEDURE — 2500000001 HC RX 250 WO HCPCS SELF ADMINISTERED DRUGS (ALT 637 FOR MEDICARE OP): Mod: SE | Performed by: INTERNAL MEDICINE

## 2024-04-28 PROCEDURE — 2500000004 HC RX 250 GENERAL PHARMACY W/ HCPCS (ALT 636 FOR OP/ED): Mod: SE | Performed by: NURSE PRACTITIONER

## 2024-04-28 PROCEDURE — 80069 RENAL FUNCTION PANEL: CPT | Performed by: INTERNAL MEDICINE

## 2024-04-28 PROCEDURE — 99239 HOSP IP/OBS DSCHRG MGMT >30: CPT | Performed by: INTERNAL MEDICINE

## 2024-04-28 PROCEDURE — 2500000005 HC RX 250 GENERAL PHARMACY W/O HCPCS: Mod: SE | Performed by: STUDENT IN AN ORGANIZED HEALTH CARE EDUCATION/TRAINING PROGRAM

## 2024-04-28 PROCEDURE — 96372 THER/PROPH/DIAG INJ SC/IM: CPT | Performed by: NURSE PRACTITIONER

## 2024-04-28 PROCEDURE — 2500000001 HC RX 250 WO HCPCS SELF ADMINISTERED DRUGS (ALT 637 FOR MEDICARE OP): Mod: SE | Performed by: NURSE PRACTITIONER

## 2024-04-28 PROCEDURE — 2500000004 HC RX 250 GENERAL PHARMACY W/ HCPCS (ALT 636 FOR OP/ED): Mod: SE | Performed by: INTERNAL MEDICINE

## 2024-04-28 PROCEDURE — 36415 COLL VENOUS BLD VENIPUNCTURE: CPT | Performed by: INTERNAL MEDICINE

## 2024-04-28 PROCEDURE — 85027 COMPLETE CBC AUTOMATED: CPT | Performed by: INTERNAL MEDICINE

## 2024-04-28 PROCEDURE — 2500000006 HC RX 250 W HCPCS SELF ADMINISTERED DRUGS (ALT 637 FOR ALL PAYERS): Mod: SE | Performed by: NURSE PRACTITIONER

## 2024-04-28 PROCEDURE — G0378 HOSPITAL OBSERVATION PER HR: HCPCS

## 2024-04-28 RX ORDER — AMOXICILLIN AND CLAVULANATE POTASSIUM 875; 125 MG/1; MG/1
1 TABLET, FILM COATED ORAL EVERY 12 HOURS SCHEDULED
Qty: 10 TABLET | Refills: 0 | Status: SHIPPED | OUTPATIENT
Start: 2024-04-28 | End: 2024-05-03

## 2024-04-28 RX ORDER — AMOXICILLIN AND CLAVULANATE POTASSIUM 875; 125 MG/1; MG/1
1 TABLET, FILM COATED ORAL EVERY 12 HOURS SCHEDULED
Qty: 10 TABLET | Refills: 0 | Status: SHIPPED | OUTPATIENT
Start: 2024-04-28 | End: 2024-04-28

## 2024-04-28 RX ADMIN — FOLIC ACID 1 MG: 1 TABLET ORAL at 09:19

## 2024-04-28 RX ADMIN — PANTOPRAZOLE SODIUM 40 MG: 40 TABLET, DELAYED RELEASE ORAL at 06:14

## 2024-04-28 RX ADMIN — GUAIFENESIN 600 MG: 600 TABLET ORAL at 09:19

## 2024-04-28 RX ADMIN — GABAPENTIN 600 MG: 300 CAPSULE ORAL at 09:19

## 2024-04-28 RX ADMIN — FLUOXETINE 20 MG: 20 CAPSULE ORAL at 09:22

## 2024-04-28 RX ADMIN — TAMSULOSIN HYDROCHLORIDE 0.4 MG: 0.4 CAPSULE ORAL at 09:20

## 2024-04-28 RX ADMIN — SENNOSIDES AND DOCUSATE SODIUM 2 TABLET: 8.6; 5 TABLET ORAL at 09:19

## 2024-04-28 RX ADMIN — OXYBUTYNIN CHLORIDE 5 MG: 5 TABLET ORAL at 09:19

## 2024-04-28 RX ADMIN — METOPROLOL SUCCINATE 50 MG: 50 TABLET, EXTENDED RELEASE ORAL at 09:19

## 2024-04-28 RX ADMIN — SODIUM CHLORIDE 75 ML/HR: 9 INJECTION, SOLUTION INTRAVENOUS at 06:14

## 2024-04-28 RX ADMIN — FINASTERIDE 5 MG: 5 TABLET, FILM COATED ORAL at 09:19

## 2024-04-28 RX ADMIN — THIAMINE HCL TAB 100 MG 100 MG: 100 TAB at 11:10

## 2024-04-28 RX ADMIN — ENOXAPARIN SODIUM 40 MG: 100 INJECTION SUBCUTANEOUS at 09:18

## 2024-04-28 RX ADMIN — BACLOFEN 10 MG: 10 TABLET ORAL at 09:20

## 2024-04-28 RX ADMIN — Medication 1 TABLET: at 09:19

## 2024-04-28 RX ADMIN — AMOXICILLIN AND CLAVULANATE POTASSIUM 1 TABLET: 875; 125 TABLET, FILM COATED ORAL at 09:19

## 2024-04-28 RX ADMIN — ASPIRIN 81 MG: 81 TABLET, COATED ORAL at 09:19

## 2024-04-28 RX ADMIN — AMLODIPINE BESYLATE 5 MG: 5 TABLET ORAL at 09:19

## 2024-04-28 RX ADMIN — CHLORTHALIDONE 25 MG: 25 TABLET ORAL at 09:19

## 2024-04-28 ASSESSMENT — LIFESTYLE VARIABLES
HEADACHE, FULLNESS IN HEAD: NOT PRESENT
NAUSEA AND VOMITING: NO NAUSEA AND NO VOMITING
AGITATION: NORMAL ACTIVITY
VISUAL DISTURBANCES: NOT PRESENT
ORIENTATION AND CLOUDING OF SENSORIUM: ORIENTED AND CAN DO SERIAL ADDITIONS
HEADACHE, FULLNESS IN HEAD: NOT PRESENT
ANXIETY: NO ANXIETY, AT EASE
PAROXYSMAL SWEATS: NO SWEAT VISIBLE
AUDITORY DISTURBANCES: NOT PRESENT
TOTAL SCORE: 0
TOTAL SCORE: 0
AGITATION: NORMAL ACTIVITY
PAROXYSMAL SWEATS: NO SWEAT VISIBLE
ANXIETY: NO ANXIETY, AT EASE
TREMOR: NO TREMOR
TREMOR: NO TREMOR
AUDITORY DISTURBANCES: NOT PRESENT
ORIENTATION AND CLOUDING OF SENSORIUM: ORIENTED AND CAN DO SERIAL ADDITIONS
VISUAL DISTURBANCES: NOT PRESENT
NAUSEA AND VOMITING: NO NAUSEA AND NO VOMITING

## 2024-04-28 ASSESSMENT — PAIN - FUNCTIONAL ASSESSMENT: PAIN_FUNCTIONAL_ASSESSMENT: 0-10

## 2024-04-28 ASSESSMENT — PAIN SCALES - GENERAL: PAINLEVEL_OUTOF10: 0 - NO PAIN

## 2024-04-28 NOTE — NURSING NOTE
1445: Patient discharged home with no needs. Medications sent to home pharmacy. IV removed, bandage applied. All belongings sent home. Discharge paperwork reviewed and understood by patient. Follow up blood work to be completed by Tuesday this week.  No further needs at this time.

## 2024-04-28 NOTE — CARE PLAN
"  Problem: Fall/Injury  Goal: Not fall by end of shift  Outcome: Progressing  Goal: Be free from injury by end of the shift  Outcome: Progressing  Goal: Verbalize understanding of personal risk factors for fall in the hospital  Outcome: Progressing   The patient's goals for the shift include \"Will have help to control this chest pain\"    The clinical goals for the shift include Will maintained  safety during shift      "

## 2024-04-28 NOTE — DISCHARGE INSTRUCTIONS
Please get blood work on Tuesday at any  facility. Order in place, no paper needed, just let staff member know that you are coming there for blood work.

## 2024-04-28 NOTE — DISCHARGE SUMMARY
Discharge Diagnosis  Chest pain    Issues Requiring Follow-Up  Follow up with PCP. Complete BMP on Tuesday 04/30/2024 for kidney function reassessment     Test Results Pending At Discharge  Pending Labs       No current pending labs.            Hospital Course  Mr Self is a 58y male with PMHx: HTN & RCC (S/P Partial Nephrectomy), HFrEF (40-45% EF 06/22), BPH, MS, Treated HCV, Hepatin Steatosis, Cerebellar Stroke (w/ residual unsteadiness), Substance Use Disorder who presented to the ED today with c/o Left sided chest pain that radiates to left shoulder, he describes as a stabbing pain.  States it is worse when he coughs.  States his cough is dry hacking with occasional moist productive with white sputum. States  symptoms for about 3 days.  States he has not taken any of his BP meds today he felt too sick.    While in the ED he was found to have an elevated /118 likely 2/2 not taking morning meds- responded well to being given home medications.  His Trops were 205->204 respectively. Utox was positive for Cocaine, marijuana and Fentanyl.  Patient to be admitted observation for CP and HTN emergency.    Patient hospital stay was notable for SHITAL, likely due to decreased oral intake in the setting of ACS symptoms, which was ruled out.  Patient had 1 episode of chest pain with ischemic changes on ECG, or troponin elevation on labs.  Chest symptoms were reproducible with chest palpation in the statement, and subsided with heating pad application.  His SHITAL was monitored daily, and gentle fluid administered resulting on the downward trend of BUN/creatinine.  At discharge patient was given requisition for a repeat BMP and 48 to 72 hours to monitor renal function will request to fax results to PCP.  Patient also experienced a tooth ache, on exam he had a second lower left molar tooth decay, with erythema and edema of the surrounding gingival mucosa.  He was prescribed Augmentin 875-125 1 tablet twice daily for 7 days at  discharge prescription sent for 5-day course to complete 7 days total.  Patient was advised to monitor for signs of intolerance such as rash, diarrhea or nausea or vomiting.  Patient is being discharged home on 4/28/2024 in stable condition, and was educated on the rationale of his chest symptoms in the setting of polysubstance abuse, and also advised follow-up BMP for kidney function monitoring.  Patient was also strongly advised to follow-up with his dentist for tooth extraction to avoid worsening tooth abscess.    Greater than 30 minutes were dedicated to discharge this patient included education and coordinating care.    Pertinent Physical Exam At Time of Discharge  Physical Exam  Constitutional: Alert active, cooperative not in acute distress  Eyes: PERRLA, clear sclera  ENMT: Moist mucosal membranes, no exudate.  Gingival edema around second molar with cavity in the lower jaw, with erythema surrounding decayed tooth.  Head / Neck: Atraumatic, normocephalic, supple neck, JVP not visualized  Lungs: Patent airways, CTABL  Heart: RRR, S1S2, no murmurs appreciated, palpable pulses in all extremities  GI: Soft, NT, ND, bowel sounds present in all quadrants  MSK: Moves all extremities freely, no restriction  of ROM, no joint edema  Extremities: Intact x 4, no peripheral edema  : No Arita catheter inserted  Breast: Deferred  Neurological: AAO x 3 to person, place and date, facial muscles symmetrical, sensation intact, strength 4/4, no acute focal neurological deficits appreciated  Psychological: Appropriate mood and behavior    Home Medications     Medication List      START taking these medications     amoxicillin-pot clavulanate 875-125 mg tablet; Commonly known as:   Augmentin; Take 1 tablet by mouth every 12 hours for 5 days.     CONTINUE taking these medications     amLODIPine 5 mg tablet; Commonly known as: Norvasc   baclofen 10 mg tablet; Commonly known as: Lioresal   chlorthalidone 25 mg tablet; Commonly  known as: Hygroton   doxepin 100 mg capsule; Commonly known as: SINEquan   ergocalciferol 1.25 MG (76710 UT) capsule; Commonly known as: Vitamin   D-2   finasteride 5 mg tablet; Commonly known as: Proscar   FLUoxetine 20 mg capsule; Commonly known as: PROzac   folic acid 1 mg tablet; Commonly known as: Folvite   gabapentin 600 mg tablet; Commonly known as: Neurontin   lisinopril 20 mg tablet   melatonin 3 mg tablet   metoprolol succinate XL 50 mg 24 hr tablet; Commonly known as: Toprol-XL   omeprazole 20 mg DR capsule; Commonly known as: PriLOSEC   oxybutynin XL 15 mg 24 hr tablet; Commonly known as: Ditropan-XL   tamsulosin 0.4 mg 24 hr capsule; Commonly known as: Flomax   thiamine 100 mg tablet; Commonly known as: Vitamin B-1   timolol 0.5 % ophthalmic solution; Commonly known as: Timoptic   traZODone 100 mg tablet; Commonly known as: Desyrel     STOP taking these medications     aspirin 81 mg EC tablet       Outpatient Follow-Up  No future appointments.    Sandeep Tolbert, DO

## 2024-04-30 LAB
ATRIAL RATE: 73 BPM
P AXIS: 62 DEGREES
P OFFSET: 200 MS
P ONSET: 148 MS
PR INTERVAL: 142 MS
Q ONSET: 219 MS
QRS COUNT: 12 BEATS
QRS DURATION: 94 MS
QT INTERVAL: 372 MS
QTC CALCULATION(BAZETT): 409 MS
QTC FREDERICIA: 397 MS
R AXIS: 46 DEGREES
T AXIS: 37 DEGREES
T OFFSET: 405 MS
VENTRICULAR RATE: 73 BPM